# Patient Record
Sex: FEMALE | Race: OTHER | Employment: UNEMPLOYED | URBAN - METROPOLITAN AREA
[De-identification: names, ages, dates, MRNs, and addresses within clinical notes are randomized per-mention and may not be internally consistent; named-entity substitution may affect disease eponyms.]

---

## 2022-04-30 ENCOUNTER — APPOINTMENT (OUTPATIENT)
Dept: GENERAL RADIOLOGY | Age: 77
DRG: 139 | End: 2022-04-30
Payer: MEDICAID

## 2022-04-30 ENCOUNTER — HOSPITAL ENCOUNTER (INPATIENT)
Age: 77
LOS: 2 days | Discharge: HOME | DRG: 139 | End: 2022-05-03
Attending: EMERGENCY MEDICINE | Admitting: INTERNAL MEDICINE
Payer: MEDICAID

## 2022-04-30 DIAGNOSIS — J18.9 PNEUMONIA OF BOTH LUNGS DUE TO INFECTIOUS ORGANISM, UNSPECIFIED PART OF LUNG: Primary | ICD-10-CM

## 2022-04-30 DIAGNOSIS — N39.0 URINARY TRACT INFECTION IN FEMALE: ICD-10-CM

## 2022-04-30 DIAGNOSIS — A41.9 SEPTICEMIA (HCC): ICD-10-CM

## 2022-04-30 LAB
ABSOLUTE BANDS #: 1.88 K/UL (ref 0–1)
ABSOLUTE EOS #: 0.17 K/UL (ref 0–0.4)
ABSOLUTE LYMPH #: 1.88 K/UL (ref 1–4.8)
ABSOLUTE MONO #: 1.37 K/UL (ref 0.1–1.3)
ALBUMIN SERPL-MCNC: 3.9 G/DL (ref 3.5–5.2)
ALP BLD-CCNC: 139 U/L (ref 35–104)
ALT SERPL-CCNC: 12 U/L (ref 5–33)
ANION GAP SERPL CALCULATED.3IONS-SCNC: 12 MMOL/L (ref 9–17)
AST SERPL-CCNC: 22 U/L
BANDS: 11 % (ref 0–10)
BASOPHILS # BLD: 0 % (ref 0–2)
BASOPHILS ABSOLUTE: 0 K/UL (ref 0–0.2)
BILIRUB SERPL-MCNC: 0.55 MG/DL (ref 0.3–1.2)
BILIRUBIN URINE: NEGATIVE
BUN BLDV-MCNC: 13 MG/DL (ref 8–23)
CALCIUM SERPL-MCNC: 8.9 MG/DL (ref 8.6–10.4)
CHLORIDE BLD-SCNC: 97 MMOL/L (ref 98–107)
CO2: 22 MMOL/L (ref 20–31)
COLOR: YELLOW
CREAT SERPL-MCNC: 0.73 MG/DL (ref 0.5–0.9)
D-DIMER QUANTITATIVE: 1.69 MG/L FEU (ref 0–0.59)
EOSINOPHILS RELATIVE PERCENT: 1 % (ref 0–4)
FLU A ANTIGEN: NEGATIVE
FLU B ANTIGEN: NEGATIVE
GFR AFRICAN AMERICAN: >60 ML/MIN
GFR NON-AFRICAN AMERICAN: >60 ML/MIN
GFR SERPL CREATININE-BSD FRML MDRD: ABNORMAL ML/MIN/{1.73_M2}
GLUCOSE BLD-MCNC: 167 MG/DL (ref 70–99)
GLUCOSE URINE: NEGATIVE
HCT VFR BLD CALC: 37.4 % (ref 36–46)
HEMOGLOBIN: 12.5 G/DL (ref 12–16)
INR BLD: 1.1
KETONES, URINE: NEGATIVE
LACTIC ACID, SEPSIS: 1.1 MMOL/L (ref 0.5–1.9)
LEUKOCYTE ESTERASE, URINE: ABNORMAL
LYMPHOCYTES # BLD: 11 % (ref 24–44)
MCH RBC QN AUTO: 27.3 PG (ref 26–34)
MCHC RBC AUTO-ENTMCNC: 33.4 G/DL (ref 31–37)
MCV RBC AUTO: 81.7 FL (ref 80–100)
MONOCYTES # BLD: 8 % (ref 1–7)
MORPHOLOGY: ABNORMAL
NITRITE, URINE: POSITIVE
PARTIAL THROMBOPLASTIN TIME: 28.1 SEC (ref 24–36)
PDW BLD-RTO: 16.2 % (ref 11.5–14.9)
PH UA: 6 (ref 5–8)
PLATELET # BLD: 194 K/UL (ref 150–450)
PMV BLD AUTO: 7.9 FL (ref 6–12)
POTASSIUM SERPL-SCNC: 4.1 MMOL/L (ref 3.7–5.3)
PRO-BNP: 912 PG/ML
PROTEIN UA: ABNORMAL
PROTHROMBIN TIME: 14.6 SEC (ref 11.8–14.6)
RBC # BLD: 4.58 M/UL (ref 4–5.2)
SARS-COV-2, RAPID: NOT DETECTED
SEG NEUTROPHILS: 69 % (ref 36–66)
SEGMENTED NEUTROPHILS ABSOLUTE COUNT: 11.8 K/UL (ref 1.3–9.1)
SODIUM BLD-SCNC: 131 MMOL/L (ref 135–144)
SPECIFIC GRAVITY UA: 1.02 (ref 1–1.03)
SPECIMEN DESCRIPTION: NORMAL
TOTAL PROTEIN: 7.8 G/DL (ref 6.4–8.3)
TROPONIN, HIGH SENSITIVITY: 9 NG/L (ref 0–14)
TURBIDITY: ABNORMAL
URINE HGB: ABNORMAL
UROBILINOGEN, URINE: NORMAL
WBC # BLD: 17.1 K/UL (ref 3.5–11)

## 2022-04-30 PROCEDURE — 85025 COMPLETE CBC W/AUTO DIFF WBC: CPT

## 2022-04-30 PROCEDURE — 85730 THROMBOPLASTIN TIME PARTIAL: CPT

## 2022-04-30 PROCEDURE — 6370000000 HC RX 637 (ALT 250 FOR IP): Performed by: STUDENT IN AN ORGANIZED HEALTH CARE EDUCATION/TRAINING PROGRAM

## 2022-04-30 PROCEDURE — 99285 EMERGENCY DEPT VISIT HI MDM: CPT

## 2022-04-30 PROCEDURE — 71045 X-RAY EXAM CHEST 1 VIEW: CPT

## 2022-04-30 PROCEDURE — 93005 ELECTROCARDIOGRAM TRACING: CPT | Performed by: STUDENT IN AN ORGANIZED HEALTH CARE EDUCATION/TRAINING PROGRAM

## 2022-04-30 PROCEDURE — 87077 CULTURE AEROBIC IDENTIFY: CPT

## 2022-04-30 PROCEDURE — 87635 SARS-COV-2 COVID-19 AMP PRB: CPT

## 2022-04-30 PROCEDURE — 96374 THER/PROPH/DIAG INJ IV PUSH: CPT

## 2022-04-30 PROCEDURE — 87804 INFLUENZA ASSAY W/OPTIC: CPT

## 2022-04-30 PROCEDURE — 2580000003 HC RX 258: Performed by: STUDENT IN AN ORGANIZED HEALTH CARE EDUCATION/TRAINING PROGRAM

## 2022-04-30 PROCEDURE — 85610 PROTHROMBIN TIME: CPT

## 2022-04-30 PROCEDURE — 87040 BLOOD CULTURE FOR BACTERIA: CPT

## 2022-04-30 PROCEDURE — 84484 ASSAY OF TROPONIN QUANT: CPT

## 2022-04-30 PROCEDURE — 80053 COMPREHEN METABOLIC PANEL: CPT

## 2022-04-30 PROCEDURE — 36415 COLL VENOUS BLD VENIPUNCTURE: CPT

## 2022-04-30 PROCEDURE — 81001 URINALYSIS AUTO W/SCOPE: CPT

## 2022-04-30 PROCEDURE — 83880 ASSAY OF NATRIURETIC PEPTIDE: CPT

## 2022-04-30 PROCEDURE — 87086 URINE CULTURE/COLONY COUNT: CPT

## 2022-04-30 PROCEDURE — 85379 FIBRIN DEGRADATION QUANT: CPT

## 2022-04-30 PROCEDURE — 87186 SC STD MICRODIL/AGAR DIL: CPT

## 2022-04-30 PROCEDURE — 83605 ASSAY OF LACTIC ACID: CPT

## 2022-04-30 RX ORDER — FAMOTIDINE 20 MG/1
TABLET, FILM COATED ORAL
COMMUNITY

## 2022-04-30 RX ORDER — GLIMEPIRIDE 2 MG/1
TABLET ORAL
Status: ON HOLD | COMMUNITY
End: 2022-05-03 | Stop reason: HOSPADM

## 2022-04-30 RX ORDER — 0.9 % SODIUM CHLORIDE 0.9 %
1000 INTRAVENOUS SOLUTION INTRAVENOUS ONCE
Status: COMPLETED | OUTPATIENT
Start: 2022-04-30 | End: 2022-05-01

## 2022-04-30 RX ORDER — MEMANTINE HYDROCHLORIDE 5 MG/1
TABLET ORAL
COMMUNITY

## 2022-04-30 RX ORDER — ACETAMINOPHEN 500 MG
1000 TABLET ORAL ONCE
Status: COMPLETED | OUTPATIENT
Start: 2022-04-30 | End: 2022-04-30

## 2022-04-30 RX ORDER — CLOPIDOGREL BISULFATE 75 MG/1
TABLET ORAL
COMMUNITY

## 2022-04-30 RX ORDER — GABAPENTIN 800 MG/1
TABLET ORAL
COMMUNITY

## 2022-04-30 RX ORDER — DONEPEZIL HYDROCHLORIDE 10 MG/1
TABLET, FILM COATED ORAL
COMMUNITY

## 2022-04-30 RX ORDER — CLONIDINE HYDROCHLORIDE 0.1 MG/1
TABLET ORAL
COMMUNITY

## 2022-04-30 RX ORDER — ATENOLOL 50 MG/1
TABLET ORAL
COMMUNITY

## 2022-04-30 RX ORDER — ZOLPIDEM TARTRATE 10 MG/1
10 TABLET ORAL
Status: ON HOLD | COMMUNITY
End: 2022-05-03 | Stop reason: HOSPADM

## 2022-04-30 RX ADMIN — ACETAMINOPHEN 1000 MG: 500 TABLET ORAL at 22:29

## 2022-04-30 RX ADMIN — SODIUM CHLORIDE 1000 ML: 9 INJECTION, SOLUTION INTRAVENOUS at 22:55

## 2022-04-30 ASSESSMENT — PAIN - FUNCTIONAL ASSESSMENT: PAIN_FUNCTIONAL_ASSESSMENT: NONE - DENIES PAIN

## 2022-05-01 ENCOUNTER — APPOINTMENT (OUTPATIENT)
Dept: CT IMAGING | Age: 77
DRG: 139 | End: 2022-05-01
Payer: MEDICAID

## 2022-05-01 PROBLEM — J18.9 PNEUMONIA: Status: ACTIVE | Noted: 2022-05-01

## 2022-05-01 PROBLEM — E66.01 MORBID OBESITY (HCC): Status: ACTIVE | Noted: 2022-05-01

## 2022-05-01 LAB
-: ABNORMAL
BACTERIA: ABNORMAL
EPITHELIAL CELLS UA: ABNORMAL /HPF
GLUCOSE BLD-MCNC: 157 MG/DL (ref 65–105)
GLUCOSE BLD-MCNC: 195 MG/DL (ref 65–105)
GLUCOSE BLD-MCNC: 354 MG/DL (ref 65–105)
RBC UA: ABNORMAL /HPF
WBC UA: ABNORMAL /HPF

## 2022-05-01 PROCEDURE — 6360000002 HC RX W HCPCS: Performed by: STUDENT IN AN ORGANIZED HEALTH CARE EDUCATION/TRAINING PROGRAM

## 2022-05-01 PROCEDURE — 6370000000 HC RX 637 (ALT 250 FOR IP): Performed by: STUDENT IN AN ORGANIZED HEALTH CARE EDUCATION/TRAINING PROGRAM

## 2022-05-01 PROCEDURE — 94640 AIRWAY INHALATION TREATMENT: CPT

## 2022-05-01 PROCEDURE — 2700000000 HC OXYGEN THERAPY PER DAY

## 2022-05-01 PROCEDURE — 6360000002 HC RX W HCPCS: Performed by: INTERNAL MEDICINE

## 2022-05-01 PROCEDURE — 94664 DEMO&/EVAL PT USE INHALER: CPT

## 2022-05-01 PROCEDURE — 2580000003 HC RX 258: Performed by: STUDENT IN AN ORGANIZED HEALTH CARE EDUCATION/TRAINING PROGRAM

## 2022-05-01 PROCEDURE — 6370000000 HC RX 637 (ALT 250 FOR IP): Performed by: INTERNAL MEDICINE

## 2022-05-01 PROCEDURE — 82947 ASSAY GLUCOSE BLOOD QUANT: CPT

## 2022-05-01 PROCEDURE — 6360000004 HC RX CONTRAST MEDICATION: Performed by: STUDENT IN AN ORGANIZED HEALTH CARE EDUCATION/TRAINING PROGRAM

## 2022-05-01 PROCEDURE — 71260 CT THORAX DX C+: CPT

## 2022-05-01 PROCEDURE — 2060000000 HC ICU INTERMEDIATE R&B

## 2022-05-01 PROCEDURE — 99223 1ST HOSP IP/OBS HIGH 75: CPT | Performed by: INTERNAL MEDICINE

## 2022-05-01 RX ORDER — INSULIN LISPRO 100 [IU]/ML
0-12 INJECTION, SOLUTION INTRAVENOUS; SUBCUTANEOUS
Status: DISCONTINUED | OUTPATIENT
Start: 2022-05-02 | End: 2022-05-02

## 2022-05-01 RX ORDER — CLONIDINE HYDROCHLORIDE 0.1 MG/1
0.1 TABLET ORAL ONCE
Status: COMPLETED | OUTPATIENT
Start: 2022-05-01 | End: 2022-05-01

## 2022-05-01 RX ORDER — FAMOTIDINE 20 MG/1
20 TABLET, FILM COATED ORAL DAILY
Status: DISCONTINUED | OUTPATIENT
Start: 2022-05-01 | End: 2022-05-03 | Stop reason: HOSPADM

## 2022-05-01 RX ORDER — ONDANSETRON 4 MG/1
4 TABLET, ORALLY DISINTEGRATING ORAL EVERY 8 HOURS PRN
Status: DISCONTINUED | OUTPATIENT
Start: 2022-05-01 | End: 2022-05-03 | Stop reason: HOSPADM

## 2022-05-01 RX ORDER — CLOPIDOGREL BISULFATE 75 MG/1
75 TABLET ORAL DAILY
Status: DISCONTINUED | OUTPATIENT
Start: 2022-05-01 | End: 2022-05-03 | Stop reason: HOSPADM

## 2022-05-01 RX ORDER — LEVOFLOXACIN 5 MG/ML
500 INJECTION, SOLUTION INTRAVENOUS EVERY 24 HOURS
Status: DISCONTINUED | OUTPATIENT
Start: 2022-05-01 | End: 2022-05-03 | Stop reason: HOSPADM

## 2022-05-01 RX ORDER — ENOXAPARIN SODIUM 100 MG/ML
40 INJECTION SUBCUTANEOUS DAILY
Status: DISCONTINUED | OUTPATIENT
Start: 2022-05-01 | End: 2022-05-03 | Stop reason: HOSPADM

## 2022-05-01 RX ORDER — DEXTROSE MONOHYDRATE 25 G/50ML
12.5 INJECTION, SOLUTION INTRAVENOUS PRN
Status: DISCONTINUED | OUTPATIENT
Start: 2022-05-01 | End: 2022-05-03 | Stop reason: HOSPADM

## 2022-05-01 RX ORDER — MEMANTINE HYDROCHLORIDE 10 MG/1
5 TABLET ORAL DAILY
Status: DISCONTINUED | OUTPATIENT
Start: 2022-05-01 | End: 2022-05-03 | Stop reason: HOSPADM

## 2022-05-01 RX ORDER — SODIUM CHLORIDE 0.9 % (FLUSH) 0.9 %
10 SYRINGE (ML) INJECTION PRN
Status: DISCONTINUED | OUTPATIENT
Start: 2022-05-01 | End: 2022-05-03 | Stop reason: HOSPADM

## 2022-05-01 RX ORDER — AMLODIPINE BESYLATE 5 MG/1
5 TABLET ORAL DAILY
Status: DISCONTINUED | OUTPATIENT
Start: 2022-05-01 | End: 2022-05-03 | Stop reason: HOSPADM

## 2022-05-01 RX ORDER — DEXTROSE MONOHYDRATE 50 MG/ML
100 INJECTION, SOLUTION INTRAVENOUS PRN
Status: DISCONTINUED | OUTPATIENT
Start: 2022-05-01 | End: 2022-05-03 | Stop reason: HOSPADM

## 2022-05-01 RX ORDER — VALSARTAN 320 MG/1
160 TABLET ORAL DAILY
Status: DISCONTINUED | OUTPATIENT
Start: 2022-05-01 | End: 2022-05-03 | Stop reason: HOSPADM

## 2022-05-01 RX ORDER — NICOTINE POLACRILEX 4 MG
15 LOZENGE BUCCAL PRN
Status: DISCONTINUED | OUTPATIENT
Start: 2022-05-01 | End: 2022-05-03 | Stop reason: HOSPADM

## 2022-05-01 RX ORDER — SODIUM CHLORIDE 9 MG/ML
INJECTION, SOLUTION INTRAVENOUS PRN
Status: DISCONTINUED | OUTPATIENT
Start: 2022-05-01 | End: 2022-05-03 | Stop reason: HOSPADM

## 2022-05-01 RX ORDER — ONDANSETRON 2 MG/ML
4 INJECTION INTRAMUSCULAR; INTRAVENOUS EVERY 6 HOURS PRN
Status: DISCONTINUED | OUTPATIENT
Start: 2022-05-01 | End: 2022-05-03 | Stop reason: HOSPADM

## 2022-05-01 RX ORDER — ATORVASTATIN CALCIUM 10 MG/1
10 TABLET, FILM COATED ORAL DAILY
Status: DISCONTINUED | OUTPATIENT
Start: 2022-05-01 | End: 2022-05-03 | Stop reason: HOSPADM

## 2022-05-01 RX ORDER — ACETAMINOPHEN 325 MG/1
650 TABLET ORAL EVERY 4 HOURS PRN
Status: DISCONTINUED | OUTPATIENT
Start: 2022-05-01 | End: 2022-05-03 | Stop reason: HOSPADM

## 2022-05-01 RX ORDER — DONEPEZIL HYDROCHLORIDE 10 MG/1
10 TABLET, FILM COATED ORAL NIGHTLY
Status: DISCONTINUED | OUTPATIENT
Start: 2022-05-01 | End: 2022-05-03 | Stop reason: HOSPADM

## 2022-05-01 RX ORDER — IPRATROPIUM BROMIDE AND ALBUTEROL SULFATE 2.5; .5 MG/3ML; MG/3ML
1 SOLUTION RESPIRATORY (INHALATION) EVERY 4 HOURS PRN
Status: DISCONTINUED | OUTPATIENT
Start: 2022-05-01 | End: 2022-05-03 | Stop reason: HOSPADM

## 2022-05-01 RX ORDER — LEVOTHYROXINE SODIUM 0.1 MG/1
100 TABLET ORAL DAILY
Status: DISCONTINUED | OUTPATIENT
Start: 2022-05-01 | End: 2022-05-03 | Stop reason: HOSPADM

## 2022-05-01 RX ORDER — SODIUM CHLORIDE 0.9 % (FLUSH) 0.9 %
5-40 SYRINGE (ML) INJECTION EVERY 12 HOURS SCHEDULED
Status: DISCONTINUED | OUTPATIENT
Start: 2022-05-01 | End: 2022-05-03 | Stop reason: HOSPADM

## 2022-05-01 RX ORDER — ATENOLOL 50 MG/1
50 TABLET ORAL DAILY
Status: DISCONTINUED | OUTPATIENT
Start: 2022-05-01 | End: 2022-05-03 | Stop reason: HOSPADM

## 2022-05-01 RX ORDER — SODIUM CHLORIDE 0.9 % (FLUSH) 0.9 %
5-40 SYRINGE (ML) INJECTION PRN
Status: DISCONTINUED | OUTPATIENT
Start: 2022-05-01 | End: 2022-05-03 | Stop reason: HOSPADM

## 2022-05-01 RX ORDER — INSULIN LISPRO 100 [IU]/ML
0-6 INJECTION, SOLUTION INTRAVENOUS; SUBCUTANEOUS NIGHTLY
Status: DISCONTINUED | OUTPATIENT
Start: 2022-05-01 | End: 2022-05-02

## 2022-05-01 RX ORDER — IPRATROPIUM BROMIDE AND ALBUTEROL SULFATE 2.5; .5 MG/3ML; MG/3ML
SOLUTION RESPIRATORY (INHALATION)
Status: DISPENSED
Start: 2022-05-01 | End: 2022-05-01

## 2022-05-01 RX ORDER — METHYLPREDNISOLONE SODIUM SUCCINATE 40 MG/ML
40 INJECTION, POWDER, LYOPHILIZED, FOR SOLUTION INTRAMUSCULAR; INTRAVENOUS DAILY
Status: DISCONTINUED | OUTPATIENT
Start: 2022-05-01 | End: 2022-05-02

## 2022-05-01 RX ORDER — 0.9 % SODIUM CHLORIDE 0.9 %
80 INTRAVENOUS SOLUTION INTRAVENOUS ONCE
Status: COMPLETED | OUTPATIENT
Start: 2022-05-01 | End: 2022-05-01

## 2022-05-01 RX ADMIN — MEMANTINE 5 MG: 10 TABLET ORAL at 09:58

## 2022-05-01 RX ADMIN — INSULIN LISPRO 5 UNITS: 100 INJECTION, SOLUTION INTRAVENOUS; SUBCUTANEOUS at 21:09

## 2022-05-01 RX ADMIN — CLOPIDOGREL BISULFATE 75 MG: 75 TABLET ORAL at 09:58

## 2022-05-01 RX ADMIN — AZITHROMYCIN MONOHYDRATE 500 MG: 500 INJECTION, POWDER, LYOPHILIZED, FOR SOLUTION INTRAVENOUS at 01:38

## 2022-05-01 RX ADMIN — AMLODIPINE BESYLATE 5 MG: 5 TABLET ORAL at 09:59

## 2022-05-01 RX ADMIN — DONEPEZIL HYDROCHLORIDE 10 MG: 10 TABLET ORAL at 20:49

## 2022-05-01 RX ADMIN — METHYLPREDNISOLONE SODIUM SUCCINATE 40 MG: 40 INJECTION, POWDER, FOR SOLUTION INTRAMUSCULAR; INTRAVENOUS at 15:41

## 2022-05-01 RX ADMIN — LEVOTHYROXINE SODIUM 100 MCG: 0.1 TABLET ORAL at 10:02

## 2022-05-01 RX ADMIN — FAMOTIDINE 20 MG: 20 TABLET ORAL at 09:58

## 2022-05-01 RX ADMIN — ATORVASTATIN CALCIUM 10 MG: 10 TABLET, FILM COATED ORAL at 09:59

## 2022-05-01 RX ADMIN — ENOXAPARIN SODIUM 40 MG: 100 INJECTION SUBCUTANEOUS at 09:57

## 2022-05-01 RX ADMIN — SODIUM CHLORIDE 80 ML: 9 INJECTION, SOLUTION INTRAVENOUS at 00:48

## 2022-05-01 RX ADMIN — ATENOLOL 50 MG: 50 TABLET ORAL at 09:59

## 2022-05-01 RX ADMIN — SODIUM CHLORIDE, PRESERVATIVE FREE 10 ML: 5 INJECTION INTRAVENOUS at 21:03

## 2022-05-01 RX ADMIN — IPRATROPIUM BROMIDE AND ALBUTEROL SULFATE 1 AMPULE: .5; 2.5 SOLUTION RESPIRATORY (INHALATION) at 00:59

## 2022-05-01 RX ADMIN — VALSARTAN 160 MG: 320 TABLET, FILM COATED ORAL at 09:58

## 2022-05-01 RX ADMIN — CLONIDINE HYDROCHLORIDE 0.1 MG: 0.1 TABLET ORAL at 10:02

## 2022-05-01 RX ADMIN — SODIUM CHLORIDE, PRESERVATIVE FREE 10 ML: 5 INJECTION INTRAVENOUS at 10:02

## 2022-05-01 RX ADMIN — CEFTRIAXONE SODIUM 1000 MG: 1 INJECTION, POWDER, FOR SOLUTION INTRAMUSCULAR; INTRAVENOUS at 00:51

## 2022-05-01 RX ADMIN — IOPAMIDOL 75 ML: 755 INJECTION, SOLUTION INTRAVENOUS at 00:47

## 2022-05-01 RX ADMIN — SODIUM CHLORIDE, PRESERVATIVE FREE 10 ML: 5 INJECTION INTRAVENOUS at 00:48

## 2022-05-01 RX ADMIN — LEVOFLOXACIN 500 MG: 5 INJECTION, SOLUTION INTRAVENOUS at 15:41

## 2022-05-01 ASSESSMENT — ENCOUNTER SYMPTOMS
ABDOMINAL PAIN: 0
COLOR CHANGE: 0
VOMITING: 0
COUGH: 1
SHORTNESS OF BREATH: 1
NAUSEA: 0

## 2022-05-01 NOTE — ED NOTES
Pt placed in gown. Pt hooked up to NIBP, pulse ox, and cardiac monitoring continuously. Pt placed on 2L O2 via NC for a spo2 of 88% ORA.       Vandana Corea RN  04/30/22 3727

## 2022-05-01 NOTE — DISCHARGE INSTR - COC
Continuity of Care Form    Patient Name: Denny Stroud   :  1945  MRN:  [id number]    Admit date:  2022  Discharge date:  ***    Code Status Order: No Order   Advance Directives:      Admitting Physician:  No admitting provider for patient encounter. PCP: James Carter MD    Discharging Nurse: [facility] Unit/Room#: 07A/07A  Discharging Unit Phone Number: ***    Emergency Contact:   Extended Emergency Contact Information  Primary Emergency Contact: [address] [facility] Phone: [phone]  Relation: Brother/Sister    Past Surgical History:  Past Surgical History:   Procedure Laterality Date    BACK SURGERY      BACK SURGERY      pinched  nerve    CHOLECYSTECTOMY         Immunization History: There is no immunization history on file for this patient. Active Problems:  Patient Active Problem List   Diagnosis Code    Diabetes ([facility] [address] E11.9    HTN (hypertension) I10    Hyperlipidemia E78.5    Hypothyroidism E03.9       Isolation/Infection:   Isolation            No Isolation          Patient Infection Status       Infection Onset Added Last Indicated Last Indicated By Review Planned Expiration Resolved Resolved By    COVID-19 (Rule Out) 22 COVID-19, Rapid (Ordered) 22      MDRO (multi-drug resistant organism)  04/02/15 04/02/15 Mic Becker RN        3/30/2015 E.  Coli urine            Nurse Assessment:  Last Vital Signs: BP (!) 168/53   Pulse 82   Temp 103 °F (39.4 °C) (Oral)   Resp 20   Ht 4' 11\" (1.499 m)   Wt 200 lb (90.7 kg)   SpO2 92%   BMI 40.40 kg/m²     Last documented pain score (0-10 scale):    Last Weight:   Wt Readings from Last 1 Encounters:   22 200 lb (90.7 kg)     Mental Status:  {IP PT MENTAL STATUS:46214}    IV Access:  { KAMLA IV ACCESS:932244661}    Nursing Mobility/ADLs:  Walking   {CHP DME MFBC:857655728}  Transfer  {CHP DME EBJV:849372685}  Bathing  {CHP DME YINH:486724207}  Dressing  {CHP DME XGYI:920271364}  Toileting  {CHP DME DXZA:384893215}  Feeding  {CHP DME KAKO:886414293}  Med Admin  {CHP DME YIRC:222971580}  Med Delivery   { KAMLA MED Delivery:560262537}    Wound Care Documentation and Therapy:        Elimination:  Continence: Bowel: {YES / NS:19986}  Bladder: {YES / KZ:32176}  Urinary Catheter: {Urinary Catheter:451830210}   Colostomy/Ileostomy/Ileal Conduit: {YES / PD:26398}       Date of Last BM: ***  No intake or output data in the 24 hours ending 22  No intake/output data recorded.     Safety Concerns:     [address] Safety Concerns:138626809}    Impairments/Disabilities:      [address] Impairments/Disabilities:939485225}    Nutrition Therapy:  Current Nutrition Therapy:   [address] [employer] Diet List:943791454}    Routes of Feeding: {[facility] DME Other Feedings:835643386}  Liquids: {Slp liquid thickness:05805}  Daily Fluid Restriction: {CHP DME Yes amt example:336357381}  Last Modified Barium Swallow with Video (Video Swallowing Test): {Done Not Done AUKQ:970318948}    Treatments at the Time of Hospital Discharge:   Respiratory Treatments: ***  Oxygen Therapy:  {Therapy; copd oxygen:32278}  Ventilator:    { CC Vent DFHB:047710990}    Rehab Therapies: {THERAPEUTIC INTERVENTION:7410012007}  Weight Bearing Status/Restrictions: [employer]  Weight Bearin}  Other Medical Equipment (for information only, NOT a DME order):  {EQUIPMENT:193284475}  Other Treatments: ***    Patient's personal belongings (please select all that are sent with patient):  {[facility] DME Belongings:073833594}    RN SIGNATURE:  {Esignature:375771625}    CASE MANAGEMENT/SOCIAL WORK SECTION    Inpatient Status Date: ***    Readmission Risk Assessment Score:  Readmission Risk              Risk of Unplanned Readmission:  0           Discharging to Facility/ Agency   Name:   Address:  Phone:  Fax:    Dialysis Facility (if applicable)   Name:  Address:  Dialysis Schedule:  Phone:  Fax:    / signature: {Esignature:947911158}    PHYSICIAN SECTION    Prognosis: {Prognosis:3423112363}    Condition at Discharge: [address] Patient Condition:763609836}    Rehab Potential (if transferring to Rehab): {Prognosis:3640237280}    Recommended Labs or Other Treatments After Discharge: ***    Physician Certification: I certify the above information and transfer of Yimi Johnson  is necessary for the continuing treatment of the diagnosis listed and that she requires {Admit to Appropriate Level of Care:90065} for {GREATER/LESS:205431531} 30 days.      Update Admission H&P: {CHP DME Changes in YUBNI:126453663}    PHYSICIAN SIGNATURE:  {Esignature:626929606}

## 2022-05-01 NOTE — FLOWSHEET NOTE
05/01/22 1450   Encounter Summary   Encounter Overview/Reason  Volunteer Encounter   Service Provided For: Patient   Referral/Consult From: Rounding   Last Encounter  05/01/22  (V)   Complexity of Encounter Low   Spiritual/Emotional needs   Type Spiritual Support   Rituals, Rites and [address]

## 2022-05-01 NOTE — PLAN OF CARE
VSS, assessments as charted. Labs/tests reviewed and reported PRN. Meds and therapies as ordered. Uses call light appropriately.   Up with stand by assist.  Gait steady

## 2022-05-01 NOTE — ED NOTES
The following labs labeled with pt sticker and tubed to lab:       [x] COVID-19 swab    [x] Rapid  [] PCR  [x] Flu Swab    [] Urine Sample  [] Pelvic Cultures  [x] Blood Cultures       Megan Bazan RN  04/30/22 2819

## 2022-05-01 NOTE — ED NOTES
Pt to CT in stretcher. Portable O2 set to 2L delivered via NC. Rocephin primed and ready for administration upon pts return to ED. Pt and family updated by Dr. Austin Walker at this time.       Annabella Smith RN  05/01/22 0813

## 2022-05-01 NOTE — ED NOTES
The following labs labeled with pt sticker and tubed to lab:     [x] Blue     [x] Lavender   [] on ice  [x] Green/yellow  [] Green/black [] on ice  [] Yellow  [x] Grey on ice      [] COVID-19 swab    [] Rapid  [] PCR  [] Peds Viral Panel     [] Urine Sample  [] Pelvic Cultures  [x] Blood Cultures        Sharmaine Chacon RN  04/30/22 9546

## 2022-05-01 NOTE — PROGRESS NOTES
05/01/22 0056   Respiratory   Respiratory Pattern Regular   Respiratory Depth Normal   Respiratory Quality/Effort Unlabored   Chest Assessment Chest expansion symmetrical   L Breath Sounds Diminished   R Breath Sounds Diminished   Cough/Sputum   Cough Strong;Productive   Modified Brock Scale   Modified Brock Scale 1   Additional Respiratory Assessments   Pulse 62   Resp 20   SpO2 93 %   Position Semi-Perez's

## 2022-05-01 NOTE — CARE COORDINATION
CASE MANAGEMENT NOTE:    Admission Date:  4/30/2022 Solis Guerra is a 78 y.o.  female    Admitted for : Pneumonia [J18.9]  Septicemia ([facility] [address] [A41.9]  Pneumonia of both lungs due to infectious organism, unspecified part of lung [J18.9]  Urinary tract infection in female [N39.0]    Met with:  Patient    PCP:  [address]       Is patient alert and oriented at time of discussion:  Yes    Current Residence/ Living Arrangements:  independently at home with dtr in [address] [place] [address] PTA:  No    Does patient go to outpatient dialysis: No  If yes, location and chair time: NA    Is patient agreeable to VNS: No    Freedom of choice provided:  No    List of [address] provided: No    VNS chosen:  No    DME:  other glucometer    Home Oxygen: No    Nebulizer: No    CPAP/BIPAP: No    Supplier: N/A    Potential Assistance Needed: No    SNF needed: No    Freedom of choice and list provided: No    Pharmacy:  [facility] on [address]       Is patient currently receiving oral anticoagulation therapy? No    Is the Patient an [facility] with Readmission Risk Score greater than 14%? No  If yes, pt needs a follow up appointment made within 7 days. Family Members/Caregivers that pt would like involved in their care:    Yes    If yes, list name here:  Sister alberto    Transportation Provider:  Family             Discharge Plan:  5/1/22 [facility] MEDICARE From home with dtr in [place]. Pt is just visiting her sister Casandra Catalan who lives here. Pt was to return to [place] on Wed. DME: glucometer VNS:none. IV levaquin , IV solumedrol 40 mg daily. Following for needs//JF                Electronically signed by: Ceci Posada RN on 5/1/2022 at 1:29 PM

## 2022-05-01 NOTE — ED NOTES
Report given to , RN from PCU  Report method in person   The following was reviewed with receiving RN: (!) /99  Temp 99.1F  Pulse 60bpm  Resp 20  spo2 95% on 2L NC   Current vital signs:                  Any medication or safety alerts were reviewed. Any pending diagnostics and notifications were also reviewed, as well as any safety concerns or issues, abnormal labs, abnormal imaging, and abnormal assessment findings. Questions were answered.         Robel Wilkes RN  05/01/22 0505

## 2022-05-01 NOTE — ED NOTES
Price placed at the bedside. Pt educated on need for urine sample. Pt instructed to press on call light when able to give specimen.      Yinka Sena RN  04/30/22 0907

## 2022-05-01 NOTE — ACP (ADVANCE CARE PLANNING)
Advance Care Planning     Advance Care Planning Activator (Inpatient)  Conversation Note      Date of ACP Conversation: 5/1/2022     Conversation Conducted with: Patient with Decision Making Capacity    ACP Activator: Octavio Ni RN        Health Care Decision Maker:     Current Designated Health Care Decision Maker:     Click here to complete [address] including section of the Healthcare Decision Maker Relationship (ie \"Primary\")  Today we documented Decision Maker(s) consistent with Legal Next of Kin hierarchy. Care Preferences    Ventilation: \"If you were in your present state of health and suddenly became very ill and were unable to breathe on your own, what would your preference be about the use of a ventilator (breathing machine) if it were available to you? \"      Would the patient desire the use of ventilator (breathing machine)?: yes    \"If your health worsens and it becomes clear that your chance of recovery is unlikely, what would your preference be about the use of a ventilator (breathing machine) if it were available to you? \"     Would the patient desire the use of ventilator (breathing machine)?: Yes      Resuscitation  \"CPR works best to restart the heart when there is a sudden event, like a heart attack, in someone who is otherwise healthy. Unfortunately, CPR does not typically restart the heart for people who have serious health conditions or who are very sick. \"    \"In the event your heart stopped as a result of an underlying serious health condition, would you want attempts to be made to restart your heart (answer \"yes\" for attempt to resuscitate) or would you prefer a natural death (answer \"no\" for do not attempt to resuscitate)? \" yes       [x] Yes   [] No   Educated Patient / Reji Public regarding differences between Advance Directives and portable DNR orders.     Length of ACP Conversation in minutes:      Conversation Outcomes:  [x] ACP discussion completed  [] Existing advance directive reviewed with patient; no changes to patient's previously recorded wishes  [] New Advance Directive completed  [] Portable Do Not Rescitate prepared for Provider review and signature  [] POLST/POST/MOLST/MOST prepared for Provider review and signature      Follow-up plan:    [] Schedule follow-up conversation to continue planning  [] Referred individual to Provider for additional questions/concerns   [] Advised patient/agent/surrogate to review completed ACP document and update if needed with changes in condition, patient preferences or care setting    [] This note routed to one or more involved healthcare providers

## 2022-05-01 NOTE — PLAN OF CARE
Problem: Discharge Planning  Goal: Discharge to home or other facility with appropriate resources  Outcome: Progressing     Problem: Safety - Adult  Goal: Free from fall injury  Outcome: Progressing  Goal: Free from fall injury  Description: INTERVENTIONS:  Outcome: Progressing     Problem: Skin/Tissue Integrity  Goal: Skin integrity intact  Outcome: Progressing

## 2022-05-01 NOTE — ED PROVIDER NOTES
Marin Parrish [address]     Pt Name: Caridad Sanders  MRN: [id number]  [place] 1945  Date of evaluation: 4/30/22       Caridad Sanders is a 78 y.o. female who presents with Fever      MDM: Patient was noted to have a urinary tract infection and pneumonia and was treated with antibiotics and was given oxygen supplementation and admitted      Vitals:   Vitals:    04/30/22 2211 04/30/22 2214   BP: (!) 168/53    Pulse: 82 82   Resp: 20    Temp: 103 °F (39.4 °C)    TempSrc: Oral    SpO2: (!) 88% 92%   Weight: 200 lb (90.7 kg)    Height: 4' 11\" (1.499 m)          I personally saw and examined the patient. I have reviewed and agree with the resident's findings, including all diagnostic interpretations and treatment plan as written. I was present for the key portions of any procedures performed and the inclusive time noted for any critical care statement.       The care is provided during an unprecedented national emergency due to the novel coronavirus, COVID [facility]  Attending Emergency Physician         Aury Ocampo DO  05/01/22 5989

## 2022-05-01 NOTE — ED NOTES
Code S called at [phone].      Afsaneh Rahman  04/30/22 2220       Natalie Christie  04/30/22 2231

## 2022-05-01 NOTE — ED PROVIDER NOTES
[facility] ED  Emergency Department Encounter  Emergency Medicine Resident     Pt Name: Daniela Payne  MRN: [id number]  [place] 1945  Date of evaluation: 4/30/22  PCP:  Gabriel Kauffman MD    CHIEF COMPLAINT       Chief Complaint   Patient presents with    Fever       HISTORY OFPRESENT ILLNESS  (Location/Symptom, Timing/Onset, Context/Setting, Quality, Duration, Modifying [place]uevenia Stair.)      Daniela Payne is a 78 y.o. female with past medical history of diabetes, hyperlipidemia, hypertension, CAD status post 1 stenting who presents with family members for complaints of fever, myalgias, cough and shortness of breath. Symptoms have been ongoing for the last 7 days however has been progressively worsening. Patient has a cough productive of yellow sputum. No known COVID-19 or other sick contacts at home. Patient states she is visiting from [place] as she has family members in the area. Denies chest pain, nausea, vomiting. Upon initial evaluation patient is hypoxic at 84% on room air, no prior history of requiring oxygen. PAST MEDICAL / SURGICAL / SOCIAL / FAMILY HISTORY      has a past medical history of Diabetes mellitus ([address] Hyperlipidemia, Hypertension, Hypothyroidism, MDRO (multiple drug resistant organisms) resistance, Thyroid disease, and Type II or unspecified type diabetes mellitus without mention of complication, not stated as uncontrolled. has a past surgical history that includes back surgery; back surgery; and Cholecystectomy.     Social History     Socioeconomic History    Marital status:      Spouse name: Not on file    Number of children: Not on file    Years of education: Not on file    Highest education level: Not on file   Occupational History    Not on file   Tobacco Use    Smoking status: Never Smoker    Smokeless tobacco: Never Used   Substance and Sexual Activity    Alcohol use: No    Drug use: No    Sexual activity: Not on file   Other Topics Concern    Not on file   Social History Narrative    ** Merged History Encounter **          Social Determinants of Health     Financial Resource Strain:     Difficulty of Paying Living Expenses: Not on file   Food Insecurity:     Worried About Running Out of Food in the Last Year: Not on file    Karyn of Food in the Last Year: Not on file   Transportation Needs:     Lack of Transportation (Medical): Not on file    Lack of Transportation (Non-Medical): Not on file   Physical Activity:     Days of Exercise per Week: Not on file    Minutes of Exercise per Session: Not on file   Stress:     Feeling of Stress : Not on file   Social Connections:     Frequency of Communication with Friends and Family: Not on file    Frequency of Social Gatherings with Friends and Family: Not on file    Attends Gnosticism Services: Not on file    Active Member of [address] [place] [address] [employer] or Organizations: Not on file    Attends Club or Organization Meetings: Not on file    Marital Status: Not on file   Intimate Partner Violence:     Fear of Current or Ex-Partner: Not on file    Emotionally Abused: Not on file    Physically Abused: Not on file    Sexually Abused: Not on file   Housing Stability:     Unable to Pay for Housing in the Last Year: Not on file    Number of [place] in the Last Year: Not on file    Unstable Housing in the Last Year: Not on file       Family History   Problem Relation Age of Onset    Cancer Mother         colon    Other Father         Emphysema    Cancer Brother         colon       Allergies:  Patient has no known allergies. Home Medications:  Prior to Admission medications    Medication Sig Start Date End Date Taking? Authorizing Provider   zolpidem (AMBIEN) 10 MG tablet 10 mg.     Historical Provider, MD   famotidine (PEPCID) 20 MG tablet famotidine 20 mg tablet    Historical Provider, MD   glimepiride (AMARYL) 2 MG tablet glimepiride 2 mg tablet    Historical Provider, MD   cloNIDine (CATAPRES) 0.1 MG tablet clonidine HCl 0.1 mg tablet    Historical Provider, MD   gabapentin (NEURONTIN) 800 MG tablet gabapentin 800 mg tablet    Historical Provider, MD   donepezil (ARICEPT) 10 MG tablet donepezil 10 mg tablet    Historical Provider, MD   sertraline (ZOLOFT) 50 MG tablet sertraline 50 mg tablet    Historical Provider, MD   clopidogrel (PLAVIX) 75 MG tablet clopidogrel 75 mg tablet    Historical Provider, MD   atenolol (TENORMIN) 50 MG tablet atenolol 50 mg tablet    Historical Provider, MD   memantine (NAMENDA) 5 MG tablet memantine 5 mg tablet    Historical Provider, MD   cephALEXin (KEFLEX) 500 MG capsule Take 1 capsule by mouth 2 times daily. 4/2/15   Elvira Hernandes MD   metFORMIN (GLUCOPHAGE) 500 MG tablet Take 500 mg by mouth 2 times daily (with meals). Historical Provider, MD   amLODIPine (NORVASC) 5 MG tablet Take 5 mg by mouth daily. Historical Provider, MD   valsartan (DIOVAN) 160 MG tablet Take 160 mg by mouth daily. Historical Provider, MD   levothyroxine (SYNTHROID) 100 MCG tablet Take 100 mcg by mouth Daily. Historical Provider, MD   simvastatin (ZOCOR) 20 MG tablet Take 20 mg by mouth nightly. Historical Provider, MD   levothyroxine (SYNTHROID) 100 MCG tablet Take 100 mcg by mouth Daily. Historical Provider, MD   amLODIPine (NORVASC) 5 MG tablet Take 5 mg by mouth daily. Historical Provider, MD   valsartan (DIOVAN) 160 MG tablet Take 160 mg by mouth daily. Historical Provider, MD   metFORMIN (GLUCOPHAGE) 500 MG tablet Take 500 mg by mouth 2 times daily (with meals). Historical Provider, MD   ondansetron (ZOFRAN) 4 MG tablet Take 1 tablet by mouth every 8 hours as needed for Nausea. 2/6/15   Wing Baca, DO       REVIEW OF SYSTEMS    (2-9 systems for level 4, 10 or more for level 5)      Review of Systems   Constitutional: Positive for fatigue and fever. HENT: Negative for congestion. Respiratory: Positive for cough and shortness of breath. Cardiovascular: Negative for chest pain. Gastrointestinal: Negative for abdominal pain, nausea and vomiting. Musculoskeletal: Positive for myalgias. Skin: Negative for color change and rash. Neurological: Negative for weakness, numbness and headaches. Psychiatric/Behavioral: Negative for confusion. PHYSICAL EXAM   (up to 7 for level 4, 8 or more for level 5)     INITIAL VITALS:    height is 4' 11\" (1.499 m) and weight is 200 lb (90.7 kg). Her oral temperature is 99.1 °F (37.3 °C). Her blood pressure is 138/99 (abnormal) and her pulse is 60. Her respiration is 20 and oxygen saturation is 95%. Physical Exam  Constitutional:       Appearance: She is diaphoretic. Comments: Awake, alert and oriented to person, place, time, patient is ill in appearance, moderate respiratory distress, however able to speak in full sentences, she is tachypneic. HENT:      Mouth/Throat:      Mouth: Mucous membranes are moist.      Pharynx: Oropharynx is clear. Eyes:      Pupils: Pupils are equal, round, and reactive to light. Cardiovascular:      Rate and Rhythm: Normal rate and regular rhythm. Heart sounds: No murmur heard. No gallop. Pulmonary:      Comments: Patient tachypneic, able to speak in few word sentences, does appear to be short of breath, lungs are rhonchorous bilaterally, oxygen saturation 84% on room air placed on 3 L nasal cannula with improvement to mid 90s. Abdominal:      General: Abdomen is flat. Palpations: Abdomen is soft. Tenderness: There is no abdominal tenderness. There is no guarding or rebound. Musculoskeletal:      Right lower leg: No edema. Left lower leg: No edema. Skin:     General: Skin is warm. Capillary Refill: Capillary refill takes less than 2 seconds. Neurological:      General: No focal deficit present. Mental Status: She is oriented to person, place, and time. Cranial Nerves: No cranial nerve deficit.       Motor: No weakness. DIFFERENTIAL  DIAGNOSIS     PLAN (LABS / IMAGING / EKG):  Orders Placed This Encounter   Procedures    Culture, Urine    Culture, Blood 1    Culture, Blood 2    COVID-19, Rapid    Rapid influenza A/B antigens    XR CHEST PORTABLE    CT CHEST PULMONARY EMBOLISM W CONTRAST    CBC with Auto Differential    Comprehensive Metabolic Panel w/ Reflex to MG    Urinalysis    APTT    Protime-INR    D-Dimer, Quantitative    Troponin    Brain Natriuretic Peptide    Microscopic Urinalysis    Inpatient consult to Internal Medicine    Respiratory Care Evaluation and Treat    EKG 12 Lead    ADMIT TO INPATIENT       MEDICATIONS ORDERED:  Orders Placed This Encounter   Medications    acetaminophen (TYLENOL) tablet 1,000 mg    0.9 % sodium chloride bolus    cefTRIAXone (ROCEPHIN) 1000 mg IVPB in 50 mL D5W minibag     Order Specific Question:   Antimicrobial Indications     Answer:   Pneumonia (CAP)     Order Specific Question:   Antimicrobial Indications     Answer:   Urinary Tract Infection    azithromycin (ZITHROMAX) 500 mg in D5W 250ml addavial     Order Specific Question:   Antimicrobial Indications     Answer:   Pneumonia (CAP)    iopamidol (ISOVUE-370) 76 % injection 75 mL    0.9 % sodium chloride bolus    sodium chloride flush 0.9 % injection 10 mL    ipratropium-albuterol (DUONEB) 0.5-2.5 (3) MG/3ML nebulizer solution     Burnette Schlatter: cabinet override    ipratropium-albuterol (DUONEB) nebulizer solution 1 ampule     Order Specific Question:   Initiate RT Bronchodilator Protocol     Answer:   Yes       DDX: Sepsis, community acquired pneumonia, COVID, flu, PE, urosepsis    Initial MDM/Plan: 78 y.o. female who presents with 1 week of fever, myalgias, shortness of breath and cough. Visiting from [place]. No known COVID-19 contacts. Seen and examined, patient is awake, alert however is ill in appearance, moderate respiratory distress, speaking in few word sentences.   Saturating 84% on room air. Placed on 3 L nasal cannula with improvement to mid 90s. Temperature 103, nontachycardic however tachypneic. Code S called given fever, tachypnea and suspected underlying septic process. Abdomen soft, tender to palpation suprapubic region however no rebound or guarding and upon palpation patient states she has to use the restroom. Lungs are rhonchorous bilaterally. Will obtain sepsis work-up, suspect underlying pneumonia given hypoxia. COVID testing, D-dimer to evaluate for PE however less likely given fever and productive sputum. Will admit. Sepsis Times and Checklist  Vital Signs: BP: (!) 138/99  Pulse: 60  Resp: 20  Temp: 99.1 °F (37.3 °C) SpO2: 95 %  SIRS (>2)   Temp > 38.3C or < 36C yes   HR > 90 no   RR > 20 yes   WBC > 12 or < 4 or >10% bands yes  SIRS (>2) and confirmed or suspected source of infection = Sepsis  Is Sepsis due to likely bacterial infection?: Yes      Sepsis Identified:   Sepsis Orders:  ·  CBC(required): Yes  ·  CMP: Yes  ·  PT/PTT: Yes  ·  Blood Cultures x2(required): Yes  ·  Urinalysis and Urine Culture: Yes  ·  Lactate(required): Yes  ·  Antibiotics Given (within 3 hours of sepsis identification, after blood cultures):  Yes    (If unable to obtain IV access for IV antibiotics within 3 hours of identification of sepsis, IM antibiotics is acceptable.)  ·             If lactate >2.0 MUST repeat within 6 hours    If elevated, is elevated lactate from a likely infectious source?: lactic normal  IV Fluid Bolus:  Is lactate > 4.0:  No  If lactate >  4.0 OR hypotension (MAP<65 mmHg) (with 2 BP readings) 30ml/kg crystalloid MUST be ordered.   No  ·           DIAGNOSTIC RESULTS / EMERGENCY DEPARTMENT COURSE / MDM     LABS:  Labs Reviewed   CBC WITH AUTO DIFFERENTIAL - Abnormal; Notable for the following components:       Result Value    WBC 17.1 (*)     RDW 16.2 (*)     Seg Neutrophils 69 (*)     Lymphocytes 11 (*)     Monocytes 8 (*)     Bands 11 (*)     Segs Absolute 11.80 (*)     Absolute Mono # 1.37 (*)     Absolute Bands # 1.88 (*)     All other components within normal limits   COMPREHENSIVE METABOLIC PANEL W/ REFLEX TO MG FOR LOW K - Abnormal; Notable for the following components:    Glucose 167 (*)     Sodium 131 (*)     Chloride 97 (*)     Alkaline Phosphatase 139 (*)     All other components within normal limits   URINALYSIS - Abnormal; Notable for the following components:    Turbidity UA Cloudy (*)     Urine Hgb MOD (*)     Protein, UA 3+ (*)     Nitrite, Urine POSITIVE (*)     Leukocyte Esterase, Urine MOD (*)     All other components within normal limits   D-DIMER, QUANTITATIVE - Abnormal; Notable for the following components:    D-Dimer, Quant 1.69 (*)     All other components within normal limits   BRAIN NATRIURETIC PEPTIDE - Abnormal; Notable for the following components:    Pro- (*)     All other components within normal limits   MICROSCOPIC URINALYSIS - Abnormal; Notable for the following components:    Bacteria, UA MANY (*)     All other components within normal limits   CULTURE, BLOOD 2   COVID-19, RAPID   RAPID INFLUENZA A/B ANTIGENS   CULTURE, URINE   CULTURE, BLOOD 1   LACTATE, SEPSIS   APTT   PROTIME-INR   TROPONIN         RADIOLOGY:  XR CHEST PORTABLE    Result Date: 4/30/2022  EXAMINATION: ONE XRAY VIEW OF THE CHEST 4/30/2022 10:24 pm COMPARISON: 02/06/2015 HISTORY: ORDERING SYSTEM PROVIDED HISTORY: hypoxia, fever, rhonchi b/l TECHNOLOGIST PROVIDED HISTORY: hypoxia, fever, rhonchi b/l Reason for Exam: hypoxia, fever, rhonchi b/l Additional signs and symptoms: cough FINDINGS: The patient is rotated to the right on the portable study. Heart size is within normal limits for the portable technique and the lungs are grossly clear. There is no pneumothorax or pleural fluid. Right shoulder reverse prosthesis. Prior left shoulder surgery. No acute bone finding. No acute cardiopulmonary disease.      CT CHEST PULMONARY EMBOLISM W CONTRAST    Result Date: 5/1/2022  EXAMINATION: CTA OF THE CHEST 5/1/2022 12:09 am TECHNIQUE: CTA of the chest was performed after the administration of intravenous contrast.  Multiplanar reformatted images are provided for review. MIP images are provided for review. Dose modulation, iterative reconstruction, and/or weight based adjustment of the mA/kV was utilized to reduce the radiation dose to as low as reasonably achievable. COMPARISON: None. HISTORY: ORDERING SYSTEM PROVIDED HISTORY: elevated d-dimer, hypoxic TECHNOLOGIST PROVIDED HISTORY: elevated d-dimer, hypoxic Decision Support Exception - unselect if not a suspected or confirmed emergency medical condition->Emergency Medical Condition (MA) Reason for Exam: cough with elevated D-dimer FINDINGS: Pulmonary Arteries: Pulmonary arteries are adequately opacified for evaluation. No evidence of intraluminal filling defect to suggest pulmonary embolism. Main pulmonary artery is normal in caliber. Mediastinum: Atherosclerosis of the thoracic aorta and coronary arteries noted. No pericardial effusion. Shotty prominent to mildly enlarged mediastinal and the right hilar lymph nodes, nonspecific and likely reactive. The representative lymph node in the azygoesophageal recess measures 2.7 x 1.5 cm on image number 103 axial series. Lungs/pleura: No pneumothorax. No pleural effusion. Subsegmental consolidation in the right lower lobe. Also, small ill-defined nodular densities in the bilateral lower lobes. These are suggestive of multifocal pneumonia. Upper Abdomen: A 2.2 cm hypodensity in the upper pole of the left kidney, probably a cyst.  Colonic diverticulosis noted. Status post cholecystectomy. Soft Tissues/Bones: Multilevel thoracic spondylosis. No evidence of pulmonary embolism. Multifocal pneumonia bilateral lower lobes, more severe on the right. Prominent to mildly enlarged mediastinal and right hilar lymph nodes, nonspecific and likely reactive.        EKG  EKG Interpretation    Interpreted by me    Rhythm: normal sinus   Rate: normal  Axis: normal  Ectopy: none  Conduction: normal  ST Segments: no acute change  T Waves: no acute change  Q Waves: none    Clinical Impression: no acute changes and normal EKG    All EKG's are interpreted by the Emergency Department Physician who either signs or Co-signs this chart in the absence of a cardiologist.    EMERGENCY DEPARTMENT COURSE:  ED Course as of 05/01/22 0137   [facility] May 01, 2022   0123 SARS-CoV-2, Rapid: Not Detected [DS]      ED Course User Index  [DS] Aimee Romero DO     WBC 17,000, Code S was called. Patient given 1 L of fluids. Lactic acid 1.1 as well as patient maintained normotension so no indication for 30 cc/kilogram bolus as patient demonstrates no signs of septic shock with normotension and lactic acid less than 4. Chest x-ray was unremarkable. UA does show positive nitrite and many bacteria concerning for urinary tract infection. However urinary tract infection does not explain initial hypoxia. D-dimer was elevated so CT PE study was added on. Given fever and hypoxia with productive cough patient was treated for community-acquired pneumonia with Rocephin and azithromycin. Rocephin will also cover urinary tract infection. Patient reassessed after Tylenol and temperature improved to 99.1. Respiratory therapy was paged for breathing treatment given patient does have history of significant secondhand smoke exposure with  smoking for nearly 50 years. COVID and flu test negative. Mild hyponatremia 131, she did receive 1 L of fluid. Spoke with Dr. Cady Juan for admission for sepsis secondary to UTI as well as suspected pneumonia. He accepted admission of the patient. Patient and family updated on plan of care and were agreeable.   CT pulmonary as of study resulted and showed no PE but did demonstrate multifocal pneumonia, patient has already been treated with Rocephin as well as azithromycin to cover for community-acquired pneumonia. Admitted to medicine    PROCEDURES:  None    CONSULTS:  IP CONSULT TO INTERNAL MEDICINE    CRITICAL CARE:  Please see attending note    FINAL IMPRESSION      1. Pneumonia of both lungs due to infectious organism, unspecified part of lung    2. Urinary tract infection in female    3. Septicemia ([facility] [address]          DISPOSITION / PLAN     DISPOSITION Admitted 05/01/2022 01:13:07 AM        PATIENTREFERRED TO:  No follow-up provider specified.     DISCHARGE MEDICATIONS:  New Prescriptions    No medications on file       Douglas Coats DO  EmergencyMedicine Resident    (Please note that portions of this note were completed with a voice recognition program.  Efforts were made to edit the dictations but occasionally words are mis-transcribed.)          Douglas Coats DO  Resident  05/01/22 [id number]

## 2022-05-02 PROBLEM — N30.00 ACUTE CYSTITIS WITHOUT HEMATURIA: Status: ACTIVE | Noted: 2022-05-02

## 2022-05-02 LAB
CULTURE: ABNORMAL
EKG ATRIAL RATE: 83 BPM
EKG P AXIS: 51 DEGREES
EKG P-R INTERVAL: 146 MS
EKG Q-T INTERVAL: 360 MS
EKG QRS DURATION: 76 MS
EKG QTC CALCULATION (BAZETT): 423 MS
EKG R AXIS: 39 DEGREES
EKG T AXIS: 45 DEGREES
EKG VENTRICULAR RATE: 83 BPM
GLUCOSE BLD-MCNC: 263 MG/DL (ref 65–105)
GLUCOSE BLD-MCNC: 281 MG/DL (ref 65–105)
GLUCOSE BLD-MCNC: 288 MG/DL (ref 65–105)
GLUCOSE BLD-MCNC: 316 MG/DL (ref 65–105)
SPECIMEN DESCRIPTION: ABNORMAL

## 2022-05-02 PROCEDURE — 6370000000 HC RX 637 (ALT 250 FOR IP): Performed by: INTERNAL MEDICINE

## 2022-05-02 PROCEDURE — 6370000000 HC RX 637 (ALT 250 FOR IP): Performed by: STUDENT IN AN ORGANIZED HEALTH CARE EDUCATION/TRAINING PROGRAM

## 2022-05-02 PROCEDURE — 99233 SBSQ HOSP IP/OBS HIGH 50: CPT | Performed by: INTERNAL MEDICINE

## 2022-05-02 PROCEDURE — 94640 AIRWAY INHALATION TREATMENT: CPT

## 2022-05-02 PROCEDURE — 6360000002 HC RX W HCPCS: Performed by: STUDENT IN AN ORGANIZED HEALTH CARE EDUCATION/TRAINING PROGRAM

## 2022-05-02 PROCEDURE — 93010 ELECTROCARDIOGRAM REPORT: CPT | Performed by: INTERNAL MEDICINE

## 2022-05-02 PROCEDURE — 82947 ASSAY GLUCOSE BLOOD QUANT: CPT

## 2022-05-02 PROCEDURE — 2060000000 HC ICU INTERMEDIATE R&B

## 2022-05-02 PROCEDURE — 2580000003 HC RX 258: Performed by: STUDENT IN AN ORGANIZED HEALTH CARE EDUCATION/TRAINING PROGRAM

## 2022-05-02 PROCEDURE — 6360000002 HC RX W HCPCS: Performed by: INTERNAL MEDICINE

## 2022-05-02 RX ORDER — GLIPIZIDE 5 MG/1
5 TABLET ORAL
Status: DISCONTINUED | OUTPATIENT
Start: 2022-05-02 | End: 2022-05-03 | Stop reason: HOSPADM

## 2022-05-02 RX ORDER — CLONIDINE HYDROCHLORIDE 0.1 MG/1
0.1 TABLET ORAL DAILY
Status: DISCONTINUED | OUTPATIENT
Start: 2022-05-02 | End: 2022-05-03 | Stop reason: HOSPADM

## 2022-05-02 RX ADMIN — SODIUM CHLORIDE: 9 INJECTION, SOLUTION INTRAVENOUS at 13:31

## 2022-05-02 RX ADMIN — ATORVASTATIN CALCIUM 10 MG: 10 TABLET, FILM COATED ORAL at 10:24

## 2022-05-02 RX ADMIN — VALSARTAN 160 MG: 320 TABLET, FILM COATED ORAL at 10:35

## 2022-05-02 RX ADMIN — LEVOTHYROXINE SODIUM 100 MCG: 0.1 TABLET ORAL at 06:33

## 2022-05-02 RX ADMIN — SODIUM CHLORIDE, PRESERVATIVE FREE 10 ML: 5 INJECTION INTRAVENOUS at 10:24

## 2022-05-02 RX ADMIN — LEVOFLOXACIN 500 MG: 5 INJECTION, SOLUTION INTRAVENOUS at 13:34

## 2022-05-02 RX ADMIN — IPRATROPIUM BROMIDE AND ALBUTEROL SULFATE 1 AMPULE: .5; 2.5 SOLUTION RESPIRATORY (INHALATION) at 21:04

## 2022-05-02 RX ADMIN — SERTRALINE HYDROCHLORIDE 50 MG: 50 TABLET ORAL at 17:11

## 2022-05-02 RX ADMIN — ENOXAPARIN SODIUM 40 MG: 100 INJECTION SUBCUTANEOUS at 10:21

## 2022-05-02 RX ADMIN — INSULIN LISPRO 8 UNITS: 100 INJECTION, SOLUTION INTRAVENOUS; SUBCUTANEOUS at 11:43

## 2022-05-02 RX ADMIN — DONEPEZIL HYDROCHLORIDE 10 MG: 10 TABLET ORAL at 21:55

## 2022-05-02 RX ADMIN — FAMOTIDINE 20 MG: 20 TABLET ORAL at 10:20

## 2022-05-02 RX ADMIN — MEMANTINE 5 MG: 10 TABLET ORAL at 10:20

## 2022-05-02 RX ADMIN — CLONIDINE HYDROCHLORIDE 0.1 MG: 0.1 TABLET ORAL at 23:07

## 2022-05-02 RX ADMIN — METHYLPREDNISOLONE SODIUM SUCCINATE 40 MG: 40 INJECTION, POWDER, FOR SOLUTION INTRAMUSCULAR; INTRAVENOUS at 10:24

## 2022-05-02 RX ADMIN — AMLODIPINE BESYLATE 5 MG: 5 TABLET ORAL at 10:19

## 2022-05-02 RX ADMIN — SODIUM CHLORIDE, PRESERVATIVE FREE 10 ML: 5 INJECTION INTRAVENOUS at 21:55

## 2022-05-02 RX ADMIN — METFORMIN HYDROCHLORIDE 500 MG: 500 TABLET ORAL at 17:10

## 2022-05-02 RX ADMIN — INSULIN LISPRO 6 UNITS: 100 INJECTION, SOLUTION INTRAVENOUS; SUBCUTANEOUS at 10:20

## 2022-05-02 RX ADMIN — ATENOLOL 50 MG: 50 TABLET ORAL at 10:19

## 2022-05-02 RX ADMIN — GLIPIZIDE 5 MG: 5 TABLET ORAL at 17:10

## 2022-05-02 RX ADMIN — CLOPIDOGREL BISULFATE 75 MG: 75 TABLET ORAL at 10:19

## 2022-05-02 ASSESSMENT — PAIN SCALES - GENERAL
PAINLEVEL_OUTOF10: 0

## 2022-05-02 NOTE — H&P
History and Physical Service  [facility] Internal Medicine    HISTORY AND PHYSICAL EXAMINATION            Date:   5/2/2022  Patient name:  Denny Stroud  MRN:   [id number]  Account:  [de-identified]  YOB: 1945  PCP:    James Carter MD  Code Status:    Full Code    Chief Complaint:   FEVER COUGH     History Obtained From:     Patient ,medical record ,nursing staff    History of Present Illnes       The patient is a 78 y.o.  /  female who presents   Denny Stroud is a 78 y.o. female with past medical history of diabetes, hyperlipidemia, hypertension, CAD status post 1 stenting who presents with family members for complaints of fever, myalgias, cough and shortness of breath. Symptoms have been ongoing for the last 7 days however has been progressively worsening. Patient has a cough productive of yellow sputum. No known COVID-19 or other sick contacts at home. Patient states she is visiting from [place] as she has family members in the area. Denies chest pain, nausea, vomiting. Upon initial evaluation patient is hypoxic at 84% on room air, no prior history of requiring oxygen. HAS PYURIA AND GRAM NEG RODS ON URINE CULTURE   DEBORAH BASILAR PNEUMONIA   Multifocal pneumonia bilateral lower lobes, more severe on the right. Past Medical History:   Diagnosis Date    Diabetes mellitus ([address]     Hyperlipidemia     Hypertension     Hypothyroidism     MDRO (multiple drug resistant organisms) resistance 3/30/2015    E. Coli urine    Thyroid disease     Type II or unspecified type diabetes mellitus without mention of complication, not stated as uncontrolled         Past Surgical History:     Past Surgical History:   Procedure Laterality Date    BACK SURGERY      BACK SURGERY      pinched  nerve    CHOLECYSTECTOMY          Medications Prior to Admission:     Prior to Admission medications    Medication Sig Start Date End Date Taking?  Authorizing Provider   zolpidem (AMBIEN) 10 MG tablet 10 mg. Historical Provider, MD   famotidine (PEPCID) 20 MG tablet famotidine 20 mg tablet    Historical Provider, MD   glimepiride (AMARYL) 2 MG tablet glimepiride 2 mg tablet    Historical Provider, MD   cloNIDine (CATAPRES) 0.1 MG tablet clonidine HCl 0.1 mg tablet    Historical Provider, MD   gabapentin (NEURONTIN) 800 MG tablet gabapentin 800 mg tablet    Historical Provider, MD   donepezil (ARICEPT) 10 MG tablet donepezil 10 mg tablet    Historical Provider, MD   sertraline (ZOLOFT) 50 MG tablet sertraline 50 mg tablet    Historical Provider, MD   clopidogrel (PLAVIX) 75 MG tablet clopidogrel 75 mg tablet    Historical Provider, MD   atenolol (TENORMIN) 50 MG tablet atenolol 50 mg tablet    Historical Provider, MD   memantine (NAMENDA) 5 MG tablet memantine 5 mg tablet    Historical Provider, MD   cephALEXin (KEFLEX) 500 MG capsule Take 1 capsule by mouth 2 times daily. 4/2/15   Kasie Winkler MD   metFORMIN (GLUCOPHAGE) 500 MG tablet Take 500 mg by mouth 2 times daily (with meals). Historical Provider, MD   amLODIPine (NORVASC) 5 MG tablet Take 5 mg by mouth daily. Historical Provider, MD   valsartan (DIOVAN) 160 MG tablet Take 160 mg by mouth daily. Historical Provider, MD   levothyroxine (SYNTHROID) 100 MCG tablet Take 100 mcg by mouth Daily. Historical Provider, MD   simvastatin (ZOCOR) 20 MG tablet Take 20 mg by mouth nightly. Historical Provider, MD   levothyroxine (SYNTHROID) 100 MCG tablet Take 100 mcg by mouth Daily. Historical Provider, MD   amLODIPine (NORVASC) 5 MG tablet Take 5 mg by mouth daily. Historical Provider, MD   valsartan (DIOVAN) 160 MG tablet Take 160 mg by mouth daily. Historical Provider, MD   metFORMIN (GLUCOPHAGE) 500 MG tablet Take 500 mg by mouth 2 times daily (with meals). Historical Provider, MD   ondansetron (ZOFRAN) 4 MG tablet Take 1 tablet by mouth every 8 hours as needed for Nausea.  2/6/15   Tito Gordon DO Allergies:     Patient has no known allergies. Social History:     Tobacco:    reports that she has never smoked. She has never used smokeless tobacco.  Alcohol:      reports no history of alcohol use. Drug Use:  reports no history of drug use. Family History:     Family History   Problem Relation Age of Onset   CharleneCurly Cancer Mother         colon    Other Father         Emphysema    Cancer Brother         colon       Review of Systems:     Misael Pedroza ,  ROS     SEE HPI          Respiratory ROS:            Negative for cough,                                              Negative for shortness of breath . Negative for wheezing ,     Cardiovascular ROS:     Negative for chest pain,                                             Negative for palpitations . Negative for worsening or new leg edema . Gastrointestinal ROS:   Negative for abdominal pain . Negative for change in bowel habits . Ashley Money Dermatological ROS:      Negative for rash,                                            .  ==============                                                       Physical Exam:         Physical Exam   Vitals:    Vitals:    05/01/22 1945 05/01/22 2330 05/02/22 0730 05/02/22 1215   BP: (!) 147/45 (!) 144/85 (!) 148/62 (!) 116/96   Pulse: 71 61 56 65   Resp: 18 18 16 18   Temp: 99 °F (37.2 °C) 98 °F (36.7 °C) 97.8 °F (36.6 °C) 98.2 °F (36.8 °C)   TempSrc: Oral Oral Oral Oral   SpO2: 99% 96% 98% 99%   Weight:  189 lb 8 oz (86 kg)     Height:                        Body mass index is 38.27 kg/m².      General Appearance:   Alert , CO-OPERATIVE ,                 Skin:                             No rash or erythema  HEENT ;                           Head                        Symmetrical , within normal limits Eye                           Conjunctiva normal ,, sclera non-icteric . Ear                           External ear ok . Neck:                            No mass , no thyroid enlargement                                           Pulmonary/Chest:        Clear to auscultation bilaterally . No wheezes, rales or rhonchi . No abnormality on percussion                                                        Cardiovascular:            Normal rate, regular rhythm,                                          No murmur or  Gallop . Abdomen:                       Soft, non-tender                                           Normal bowels sounds,                                             Extremities:                    No  Edema .                                            Neurological ;                 No focal motor deficit ,                 No focal sensory deficit ,    Musculo-skeletal ;                  No  gait abnormality                  No significant joint abnormality,                   Psych:   see Psych notect ,                                                                                           Investigations:      Laboratory Testing:  Recent Results (from the past 24 hour(s))   POC Glucose Fingerstick    Collection Time: 05/01/22  8:04 PM   Result Value Ref Range    POC Glucose 354 (H) 65 - 105 mg/dL   POC Glucose Fingerstick    Collection Time: 05/02/22  6:29 AM   Result Value Ref Range    POC Glucose 263 (H) 65 - 105 mg/dL   POC Glucose Fingerstick    Collection Time: 05/02/22 10:59 AM   Result Value Ref Range    POC Glucose 316 (H) 65 - 105 mg/dL       Recent Labs     04/30/22  2227   HGB 12.5   HCT 37.4   WBC 17.1*   MCV 81.7   *   K 4.1   CL 97*   CO2 22   BUN 13   CREATININE 0.73   GLUCOSE 167*   INR 1.1   PROTIME 14.6 APTT 28.1   AST 22   ALT 12   LABALBU 3.9       Hematology:  Recent Labs     04/30/22 2227   WBC 17.1*   RBC 4.58   HGB 12.5   HCT 37.4   MCV 81.7   MCH 27.3   MCHC 33.4   RDW 16.2*      MPV 7.9   INR 1.1   DDIMER 1.69*     Chemistry:  Recent Labs     04/30/22 2227   *   K 4.1   CL 97*   CO2 22   GLUCOSE 167*   BUN 13   CREATININE 0.73   ANIONGAP 12   LABGLOM >60   GFRAA >60   CALCIUM 8.9   PROBNP 912*     Recent Labs     04/30/22 2227 05/01/22  0604 05/01/22  1124 05/01/22 2004 05/02/22 0629 05/02/22  1059   PROT 7.8  --   --   --   --   --    LABALBU 3.9  --   --   --   --   --    AST 22  --   --   --   --   --    ALT 12  --   --   --   --   --    ALKPHOS 139*  --   --   --   --   --    BILITOT 0.55  --   --   --   --   --    POCGLU  --  157* 195* 354* 263* 316*       Imaging/Diagnostics:       XR CHEST PORTABLE    Result Date: 4/30/2022  EXAMINATION: ONE XRAY VIEW OF THE CHEST 4/30/2022 10:24 pm COMPARISON: 02/06/2015 HISTORY: ORDERING SYSTEM PROVIDED HISTORY: hypoxia, fever, rhonchi b/l TECHNOLOGIST PROVIDED HISTORY: hypoxia, fever, rhonchi b/l Reason for Exam: hypoxia, fever, rhonchi b/l Additional signs and symptoms: cough FINDINGS: The patient is rotated to the right on the portable study. Heart size is within normal limits for the portable technique and the lungs are grossly clear. There is no pneumothorax or pleural fluid. Right shoulder reverse prosthesis. Prior left shoulder surgery. No acute bone finding. No acute cardiopulmonary disease. CT CHEST PULMONARY EMBOLISM W CONTRAST    Result Date: 5/1/2022  EXAMINATION: CTA OF THE CHEST 5/1/2022 12:09 am TECHNIQUE: CTA of the chest was performed after the administration of intravenous contrast.  Multiplanar reformatted images are provided for review. MIP images are provided for review.  Dose modulation, iterative reconstruction, and/or weight based adjustment of the mA/kV was utilized to reduce the radiation dose to as low as reasonably achievable. COMPARISON: None. HISTORY: ORDERING SYSTEM PROVIDED HISTORY: elevated d-dimer, hypoxic TECHNOLOGIST PROVIDED HISTORY: elevated d-dimer, hypoxic Decision Support Exception - unselect if not a suspected or confirmed emergency medical condition->Emergency Medical Condition (MA) Reason for Exam: cough with elevated D-dimer FINDINGS: Pulmonary Arteries: Pulmonary arteries are adequately opacified for evaluation. No evidence of intraluminal filling defect to suggest pulmonary embolism. Main pulmonary artery is normal in caliber. Mediastinum: Atherosclerosis of the thoracic aorta and coronary arteries noted. No pericardial effusion. Shotty prominent to mildly enlarged mediastinal and the right hilar lymph nodes, nonspecific and likely reactive. The representative lymph node in the azygoesophageal recess measures 2.7 x 1.5 cm on image number 103 axial series. Lungs/pleura: No pneumothorax. No pleural effusion. Subsegmental consolidation in the right lower lobe. Also, small ill-defined nodular densities in the bilateral lower lobes. These are suggestive of multifocal pneumonia. Upper Abdomen: A 2.2 cm hypodensity in the upper pole of the left kidney, probably a cyst.  Colonic diverticulosis noted. Status post cholecystectomy. Soft Tissues/Bones: Multilevel thoracic spondylosis. No evidence of pulmonary embolism. Multifocal pneumonia bilateral lower lobes, more severe on the right. Prominent to mildly enlarged mediastinal and right hilar lymph nodes, nonspecific and likely reactive.           Current Facility-Administered Medications   Medication Dose Route Frequency Provider Last Rate Last Admin    sodium chloride flush 0.9 % injection 10 mL  10 mL IntraVENous PRN Joyce Quintero, DO   10 mL at 05/01/22 0048    sodium chloride flush 0.9 % injection 5-40 mL  5-40 mL IntraVENous 2 times per day Joyce Quintero DO   10 mL at 05/02/22 1024    sodium chloride flush 0.9 % injection 5-40 mL 5-40 mL IntraVENous PRN [facility], DO        0.9 % sodium chloride infusion   IntraVENous PRN [facility], DO 10 mL/hr at 05/02/22 1331 New Bag at 05/02/22 1331    enoxaparin (LOVENOX) injection 40 mg  40 mg SubCUTAneous Daily [facility], DO   40 mg at 05/02/22 1021    acetaminophen (TYLENOL) tablet 650 mg  650 mg Oral Q4H PRN [facility], DO        ondansetron (ZOFRAN-ODT) disintegrating tablet 4 mg  4 mg Oral Q8H PRN [facility], DO        Or    ondansetron [facility]) injection 4 mg  4 mg IntraVENous Q6H PRN [facility], DO        amLODIPine (NORVASC) tablet 5 mg  5 mg Oral Daily [facility], DO   5 mg at 05/02/22 1019    clopidogrel (PLAVIX) tablet 75 mg  75 mg Oral Daily [facility], DO   75 mg at 05/02/22 1019    donepezil (ARICEPT) tablet 10 mg  10 mg Oral Nightly [facility], DO   10 mg at 05/01/22 2049    famotidine (PEPCID) tablet 20 mg  20 mg Oral Daily [facility], DO   20 mg at 05/02/22 1020    levothyroxine (SYNTHROID) tablet 100 mcg  100 mcg Oral Daily [facility], DO   100 mcg at 05/02/22 9694    memantine (NAMENDA) tablet 5 mg  5 mg Oral Daily [facility], DO   5 mg at 05/02/22 1020    atorvastatin (LIPITOR) tablet 10 mg  10 mg Oral Daily [facility], DO   10 mg at 05/02/22 1024    valsartan (DIOVAN) tablet 160 mg  160 mg Oral Daily [facility], DO   160 mg at 05/02/22 1035    atenolol (TENORMIN) tablet 50 mg  50 mg Oral Daily [facility], DO   50 mg at 05/02/22 1019    ipratropium-albuterol (DUONEB) nebulizer solution 1 ampule  1 ampule Inhalation Q4H PRN [facility], DO   1 ampule at 05/01/22 0059    levoFLOXacin (LEVAQUIN) 500 MG/100ML infusion 500 mg  500 mg IntraVENous Q24H Socorro Ortiz  mL/hr at 05/02/22 1334 500 mg at 05/02/22 1334    methylPREDNISolone sodium (SOLU-MEDROL) injection 40 mg  40 mg IntraVENous Daily Socorro Ortiz MD   40 mg at 05/02/22 1024    insulin lispro (HUMALOG) injection vial 0-12 Units  0-12 Units SubCUTAneous TID  oNna Kingston MD   8 Units at 05/02/22 1143    insulin lispro (HUMALOG) injection vial 0-6 Units  0-6 Units SubCUTAneous Nightly Nona Kingston MD   5 Units at 05/01/22 2109    glucose (GLUTOSE) 40 % oral gel 15 g  15 g Oral PRN Nona Kingston MD        dextrose 50 % IV solution  12.5 g IntraVENous PRN Nona Kingston MD        glucagon (rDNA) injection 1 mg  1 mg IntraMUSCular PRN Nona Kingston MD        dextrose 5 % solution  100 mL/hr IntraVENous PRN Nona Kingston MD         Impressions :      1. Principal Problem:    Pneumonia of both lower lobes due to infectious organism  Active Problems: Morbid obesity ([facility] [address]    Diabetes ([facility] [address]    HTN (hypertension)    Hyperlipidemia    Hypothyroidism  Resolved Problems:    * No resolved hospital problems. *        2.  has a past medical history of Diabetes mellitus ([facility] [address] Hyperlipidemia, Hypertension, Hypothyroidism, MDRO (multiple drug resistant organisms) resistance (3/30/2015), Thyroid disease, and Type II or unspecified type diabetes mellitus without mention of complication, not stated as uncontrolled. Plans:     1 HAS BILATERAL BASILAR PNEUMONIA AND UTI  DM   HYPERGLYCEMIA     Medications:      Allergies:  No Known Allergies    Current Meds:   Scheduled Meds:    glipiZIDE  5 mg Oral BID AC    metFORMIN  500 mg Oral BID     sertraline  50 mg Oral Daily    sodium chloride flush  5-40 mL IntraVENous 2 times per day    enoxaparin  40 mg SubCUTAneous Daily    amLODIPine  5 mg Oral Daily    clopidogrel  75 mg Oral Daily    donepezil  10 mg Oral Nightly    famotidine  20 mg Oral Daily    levothyroxine  100 mcg Oral Daily    memantine  5 mg Oral Daily    atorvastatin  10 mg Oral Daily    valsartan  160 mg Oral Daily    atenolol  50 mg Oral Daily    levofloxacin  500 mg IntraVENous Q24H     Continuous Infusions:    sodium chloride 10 mL/hr at 05/02/22 1331    dextrose       PRN Meds: sodium chloride flush, sodium chloride flush, sodium chloride, acetaminophen, ondansetron **OR** ondansetron, ipratropium-albuterol, glucose, dextrose, glucagon (rDNA), dextrose          Joshua Cronin MD  5/2/2022  3:07 PM

## 2022-05-02 NOTE — FLOWSHEET NOTE
05/02/22 1539   Encounter Summary   Encounter Overview/Reason   Encounter   Service Provided For: Patient   Referral/Consult From: Rounding   Last Encounter  05/02/22   Complexity of Encounter Low   Spiritual/Emotional needs   Type Spiritual Support   Rituals, Rites and Sacraments   Type Anointing  (Lorrie Arellano 5-2-22)

## 2022-05-02 NOTE — CARE COORDINATION
ONGOING DISCHARGE PLAN:    Patient is alert and oriented x4. Spoke with patient regarding discharge plan and patient confirms that plan is still to go home with family. IV Levaquin, IV solumedrol 40 mg QD    Will continue to follow for additional discharge needs.     Electronically signed by Austin Bain RN on 5/2/2022 at 3:04 PM

## 2022-05-02 NOTE — H&P
[facility]  Meño Spear MD; Yousif Osborne MD; Gilmer Levy MD; MD Sp Chavarria MD; MD PHILIP [facility]   [facility]    HISTORY AND PHYSICAL EXAMINATION            Date:   5/1/2022  Patient name:  Maxx Hernandez  Date of admission:  4/30/2022 10:10 PM  MRN:   [id number]  Account:  [de-identified]  YOB: 1945  PCP:    Yojana Garcias MD  Room:   2095/2095-01  Code Status:    Full Code    Chief Complaint:     Chief Complaint   Patient presents with    Fever   sob     History Obtained From:     patient    History of Present Illness:     Maxx Hernandez is a 78 y.o.  /  female who presents with Fever   and is admitted to the hospital for the management of Pneumonia. 74-year-old female with underlying history of diabetes, hyperlipidemia, hypertension, coronary disease with 1 stent who presents with fever, cough, shortness of breath for last several days, coughing up some yellowish sputum, chest x-ray showed pneumonia, her oxygen saturations in the ER was 84%, hypoxic,      Past Medical History:     Past Medical History:   Diagnosis Date    Diabetes mellitus ([address]     Hyperlipidemia     Hypertension     Hypothyroidism     MDRO (multiple drug resistant organisms) resistance 3/30/2015    E. Coli urine    Thyroid disease     Type II or unspecified type diabetes mellitus without mention of complication, not stated as uncontrolled         Past Surgical History:     Past Surgical History:   Procedure Laterality Date    BACK SURGERY      BACK SURGERY      pinched  nerve    CHOLECYSTECTOMY          Medications Prior to Admission:     Prior to Admission medications    Medication Sig Start Date End Date Taking? Authorizing Provider   zolpidem (AMBIEN) 10 MG tablet 10 mg.     Historical Provider, MD   famotidine (PEPCID) 20 MG tablet famotidine 20 mg tablet Historical Provider, MD   glimepiride (AMARYL) 2 MG tablet glimepiride 2 mg tablet    Historical Provider, MD   cloNIDine (CATAPRES) 0.1 MG tablet clonidine HCl 0.1 mg tablet    Historical Provider, MD   gabapentin (NEURONTIN) 800 MG tablet gabapentin 800 mg tablet    Historical Provider, MD   donepezil (ARICEPT) 10 MG tablet donepezil 10 mg tablet    Historical Provider, MD   sertraline (ZOLOFT) 50 MG tablet sertraline 50 mg tablet    Historical Provider, MD   clopidogrel (PLAVIX) 75 MG tablet clopidogrel 75 mg tablet    Historical Provider, MD   atenolol (TENORMIN) 50 MG tablet atenolol 50 mg tablet    Historical Provider, MD   memantine (NAMENDA) 5 MG tablet memantine 5 mg tablet    Historical Provider, MD   cephALEXin (KEFLEX) 500 MG capsule Take 1 capsule by mouth 2 times daily. 4/2/15   Diaz Webb MD   metFORMIN (GLUCOPHAGE) 500 MG tablet Take 500 mg by mouth 2 times daily (with meals). Historical Provider, MD   amLODIPine (NORVASC) 5 MG tablet Take 5 mg by mouth daily. Historical Provider, MD   valsartan (DIOVAN) 160 MG tablet Take 160 mg by mouth daily. Historical Provider, MD   levothyroxine (SYNTHROID) 100 MCG tablet Take 100 mcg by mouth Daily. Historical Provider, MD   simvastatin (ZOCOR) 20 MG tablet Take 20 mg by mouth nightly. Historical Provider, MD   levothyroxine (SYNTHROID) 100 MCG tablet Take 100 mcg by mouth Daily. Historical Provider, MD   amLODIPine (NORVASC) 5 MG tablet Take 5 mg by mouth daily. Historical Provider, MD   valsartan (DIOVAN) 160 MG tablet Take 160 mg by mouth daily. Historical Provider, MD   metFORMIN (GLUCOPHAGE) 500 MG tablet Take 500 mg by mouth 2 times daily (with meals). Historical Provider, MD   ondansetron (ZOFRAN) 4 MG tablet Take 1 tablet by mouth every 8 hours as needed for Nausea. 2/6/15   Lakisha Cline DO        Allergies:     Patient has no known allergies. Social History:     Tobacco:    reports that she has never smoked.  She has never used smokeless tobacco.  Alcohol:      reports no history of alcohol use. Drug Use:  reports no history of drug use. Family History:     Family History   Problem Relation Age of Onset   Floydene Ada Cancer Mother         colon    Other Father         Emphysema    Cancer Brother         colon       Review of Systems:     Positive and Negative as described in HPI.     CONSTITUTIONAL:  negative for fevers, chills, sweats, fatigue, weight loss  HEENT:  negative for vision, hearing changes, runny nose, throat pain  RESPIRATORY: Positive for shortness of breath, cough, congestion, wheezing  CARDIOVASCULAR:  negative for chest pain, palpitations  GASTROINTESTINAL:  negative for nausea, vomiting, diarrhea, constipation, change in bowel habits, abdominal pain   GENITOURINARY:  negative for difficulty of urination, burning with urination, frequency   INTEGUMENT:  negative for rash, skin lesions, easy bruising   HEMATOLOGIC/LYMPHATIC:  negative for swelling/edema   ALLERGIC/IMMUNOLOGIC:  negative for urticaria , itching  ENDOCRINE:  negative increase in drinking, increase in urination, hot or cold intolerance  MUSCULOSKELETAL:  negative joint pains, muscle aches, swelling of joints  NEUROLOGICAL:  negative for headaches, dizziness, lightheadedness, numbness, pain, tingling extremities  BEHAVIOR/PSYCH:  negative for depression, anxiety    Physical Exam:   BP (!) 147/45   Pulse 71   Temp 99 °F (37.2 °C) (Oral)   Resp 18   Ht 4' 11\" (1.499 m)   Wt 200 lb (90.7 kg)   SpO2 99%   BMI 40.40 kg/m²   Temp (24hrs), Av.4 °F (37.4 °C), Min:98.1 °F (36.7 °C), Max:103 °F (39.4 °C)    Recent Labs     22  0604 22  1124 22   POCGLU 157* 195* 354*       Intake/Output Summary (Last 24 hours) at 2022  Last data filed at 2022 1803  Gross per 24 hour   Intake 2294.53 ml   Output --   Net 2294.53 ml       General Appearance: alert, well appearing, and in no acute distress  Mental status: oriented to person, place, and time  Head: normocephalic, atraumatic  Eye: no icterus, redness, pupils equal and reactive, extraocular eye movements intact, conjunctiva clear  Ear: normal external ear, no discharge, hearing intact  Nose: no drainage noted  Mouth: mucous membranes moist  Neck: supple, no carotid bruits, thyroid not palpable  Lungs: Bilateral equal air entry, coarse breath sounds  Cardiovascular: normal rate, regular rhythm, no murmur, gallop, rub  Abdomen: Soft, nontender, nondistended, normal bowel sounds, no hepatomegaly or splenomegaly  Neurologic: There are no new focal motor or sensory deficits, normal muscle tone and bulk, no abnormal sensation, normal speech, cranial nerves II through XII grossly intact  Skin: No gross lesions, rashes, bruising or bleeding on exposed skin area  Extremities: peripheral pulses palpable, no pedal edema or calf pain with palpation  Psych: normal affect    Investigations:      Laboratory Testing:  Recent Results (from the past 24 hour(s))   CBC with Auto Differential    Collection Time: 04/30/22 10:27 PM   Result Value Ref Range    WBC 17.1 (H) 3.5 - 11.0 k/uL    RBC 4.58 4.0 - 5.2 m/uL    Hemoglobin 12.5 12.0 - 16.0 g/dL    Hematocrit 37.4 36 - 46 %    MCV 81.7 80 - 100 fL    MCH 27.3 26 - 34 pg    MCHC 33.4 31 - 37 g/dL    RDW 16.2 (H) 11.5 - 14.9 %    Platelets 983 890 - 755 k/uL    MPV 7.9 6.0 - 12.0 fL    Seg Neutrophils 69 (H) 36 - 66 %    Lymphocytes 11 (L) 24 - 44 %    Monocytes 8 (H) 1 - 7 %    Eosinophils % 1 0 - 4 %    Basophils 0 0 - 2 %    Bands 11 (H) 0 - 10 %    Segs Absolute 11.80 (H) 1.3 - 9.1 k/uL    Absolute Lymph # 1.88 1.0 - 4.8 k/uL    Absolute Mono # 1.37 (H) 0.1 - 1.3 k/uL    Absolute Eos # 0.17 0.0 - 0.4 k/uL    Basophils Absolute 0.00 0.0 - 0.2 k/uL    Absolute Bands # 1.88 (H) 0.0 - 1.0 k/uL    Morphology ANISOCYTOSIS PRESENT    Comprehensive Metabolic Panel w/ Reflex to MG    Collection Time: 04/30/22 10:27 PM   Result Value Ref Range    Glucose 167 (H) 70 - 99 mg/dL    BUN 13 8 - 23 mg/dL    CREATININE 0.73 0.50 - 0.90 mg/dL    Calcium 8.9 8.6 - 10.4 mg/dL    Sodium 131 (L) 135 - 144 mmol/L    Potassium 4.1 3.7 - 5.3 mmol/L    Chloride 97 (L) 98 - 107 mmol/L    CO2 22 20 - 31 mmol/L    Anion Gap 12 9 - 17 mmol/L    Alkaline Phosphatase 139 (H) 35 - 104 U/L    ALT 12 5 - 33 U/L    AST 22 <32 U/L    Total Bilirubin 0.55 0.3 - 1.2 mg/dL    Total Protein 7.8 6.4 - 8.3 g/dL    Albumin 3.9 3.5 - 5.2 g/dL    GFR Non-African American >60 >60 mL/min    GFR African American >60 >60 mL/min    GFR Comment         Lactate, Sepsis    Collection Time: 04/30/22 10:27 PM   Result Value Ref Range    Lactic Acid, Sepsis 1.1 0.5 - 1.9 mmol/L   Culture, Blood 2    Collection Time: 04/30/22 10:27 PM    Specimen: Blood   Result Value Ref Range    Specimen Description . BLOOD LEFT WRIST     Culture NO GROWTH 12 HOURS    APTT    Collection Time: 04/30/22 10:27 PM   Result Value Ref Range    PTT 28.1 24.0 - 36.0 sec   Protime-INR    Collection Time: 04/30/22 10:27 PM   Result Value Ref Range    Protime 14.6 11.8 - 14.6 sec    INR 1.1    D-Dimer, Quantitative    Collection Time: 04/30/22 10:27 PM   Result Value Ref Range    D-Dimer, Quant 1.69 (H) 0.00 - 0.59 mg/L FEU   Troponin    Collection Time: 04/30/22 10:27 PM   Result Value Ref Range    Troponin, High Sensitivity 9 0 - 14 ng/L   Brain Natriuretic Peptide    Collection Time: 04/30/22 10:27 PM   Result Value Ref Range    Pro- (H) <300 pg/mL   Culture, Blood 1    Collection Time: 04/30/22 10:45 PM    Specimen: Blood   Result Value Ref Range    Specimen Description . BLOOD     Special Requests LEFT FOREARM     Culture NO GROWTH <24 HRS    COVID-19, Rapid    Collection Time: 04/30/22 10:55 PM    Specimen: Nasopharyngeal Swab   Result Value Ref Range    Specimen Description . NASOPHARYNGEAL SWAB     SARS-CoV-2, Rapid Not Detected Not Detected   Rapid influenza A/B antigens    Collection Time: 04/30/22 10:55 PM    Specimen: Nasopharyngeal   Result Value Ref Range    Flu A Antigen NEGATIVE NEGATIVE    Flu B Antigen NEGATIVE NEGATIVE   Urinalysis    Collection Time: 04/30/22 11:44 PM   Result Value Ref Range    Color, UA Yellow Yellow    Turbidity UA Cloudy (A) Clear    Glucose, Ur NEGATIVE NEGATIVE    Bilirubin Urine NEGATIVE NEGATIVE    Ketones, Urine NEGATIVE NEGATIVE    Specific Gravity, UA 1.023 1.000 - 1.030    Urine Hgb MOD (A) NEGATIVE    pH, UA 6.0 5.0 - 8.0    Protein, UA 3+ (A) NEGATIVE    Urobilinogen, Urine Normal Normal    Nitrite, Urine POSITIVE (A) NEGATIVE    Leukocyte Esterase, Urine MOD (A) NEGATIVE   Microscopic Urinalysis    Collection Time: 04/30/22 11:44 PM   Result Value Ref Range    -          WBC, UA TOO NUMEROUS TO COUNT /HPF    RBC, UA 6 TO 9 /HPF    Epithelial Cells UA 21 TO 50 /HPF    Bacteria, UA MANY (A) None   Culture, Urine    Collection Time: 04/30/22 11:45 PM    Specimen: Urine, clean catch   Result Value Ref Range    Specimen Description . CLEAN CATCH URINE     Culture SPECIMEN RECEIVED    POC Glucose Fingerstick    Collection Time: 05/01/22  6:04 AM   Result Value Ref Range    POC Glucose 157 (H) 65 - 105 mg/dL   POC Glucose Fingerstick    Collection Time: 05/01/22 11:24 AM   Result Value Ref Range    POC Glucose 195 (H) 65 - 105 mg/dL   POC Glucose Fingerstick    Collection Time: 05/01/22  8:04 PM   Result Value Ref Range    POC Glucose 354 (H) 65 - 105 mg/dL       Imaging/Diagnostics:  XR CHEST PORTABLE    Result Date: 4/30/2022  No acute cardiopulmonary disease. CT CHEST PULMONARY EMBOLISM W CONTRAST    Result Date: 5/1/2022  No evidence of pulmonary embolism. Multifocal pneumonia bilateral lower lobes, more severe on the right. Prominent to mildly enlarged mediastinal and right hilar lymph nodes, nonspecific and likely reactive.        Assessment :      Hospital Problems           Last Modified POA    * (Principal) Pneumonia 5/1/2022 Yes    Morbid obesity ([address] 5/1/2022 Yes    Diabetes ([address] 5/1/2022 Yes    HTN (hypertension) 5/1/2022 Yes    Hyperlipidemia 5/1/2022 Yes    Hypothyroidism 5/1/2022 Yes          Plan:     Patient status inpatient in the Progressive Unit/Step down    1. CAP/SIRS , abx, resp aerosols , cultures pending   2. Dm2, sugars Ac&HS   3. HTN  , home meds   4. Hypothyroidism, home meds   5. MO,low sabina diet   6. DVT PPx   7. Full code status     Consultations:   IP CONSULT TO INTERNAL MEDICINE     Patient is admitted as inpatient status because of co-morbidities listed above, severity of signs and symptoms as outlined, requirement for current medical therapies and most importantly because of direct risk to patient if care not provided in a hospital setting. Expected length of stay > 48 hours. Rubén Boyd MD  5/1/2022  8:34 PM    Copy sent to Dr. Sj Middleton MD    Please note that this chart was generated using voice recognition Dragon dictation software. Although every effort was made to ensure the accuracy of this automated transcription, some errors in transcription may have occurred.

## 2022-05-03 ENCOUNTER — APPOINTMENT (OUTPATIENT)
Dept: GENERAL RADIOLOGY | Age: 77
DRG: 139 | End: 2022-05-03
Payer: MEDICAID

## 2022-05-03 VITALS
DIASTOLIC BLOOD PRESSURE: 55 MMHG | SYSTOLIC BLOOD PRESSURE: 155 MMHG | RESPIRATION RATE: 18 BRPM | OXYGEN SATURATION: 96 % | TEMPERATURE: 97.7 F | HEIGHT: 59 IN | WEIGHT: 189.5 LBS | HEART RATE: 60 BPM | BODY MASS INDEX: 38.2 KG/M2

## 2022-05-03 LAB
GLUCOSE BLD-MCNC: 142 MG/DL (ref 65–105)
GLUCOSE BLD-MCNC: 150 MG/DL (ref 65–105)
GLUCOSE BLD-MCNC: 214 MG/DL (ref 65–105)

## 2022-05-03 PROCEDURE — 6360000002 HC RX W HCPCS: Performed by: INTERNAL MEDICINE

## 2022-05-03 PROCEDURE — 99239 HOSP IP/OBS DSCHRG MGMT >30: CPT | Performed by: INTERNAL MEDICINE

## 2022-05-03 PROCEDURE — 6370000000 HC RX 637 (ALT 250 FOR IP): Performed by: STUDENT IN AN ORGANIZED HEALTH CARE EDUCATION/TRAINING PROGRAM

## 2022-05-03 PROCEDURE — 6370000000 HC RX 637 (ALT 250 FOR IP): Performed by: INTERNAL MEDICINE

## 2022-05-03 PROCEDURE — 2580000003 HC RX 258: Performed by: STUDENT IN AN ORGANIZED HEALTH CARE EDUCATION/TRAINING PROGRAM

## 2022-05-03 PROCEDURE — 6360000002 HC RX W HCPCS: Performed by: STUDENT IN AN ORGANIZED HEALTH CARE EDUCATION/TRAINING PROGRAM

## 2022-05-03 PROCEDURE — 82947 ASSAY GLUCOSE BLOOD QUANT: CPT

## 2022-05-03 PROCEDURE — 71046 X-RAY EXAM CHEST 2 VIEWS: CPT

## 2022-05-03 RX ORDER — GLIPIZIDE 5 MG/1
5 TABLET ORAL
Qty: 60 TABLET | Refills: 3 | Status: SHIPPED | OUTPATIENT
Start: 2022-05-03

## 2022-05-03 RX ORDER — ATORVASTATIN CALCIUM 10 MG/1
10 TABLET, FILM COATED ORAL DAILY
Qty: 30 TABLET | Refills: 3 | Status: SHIPPED | OUTPATIENT
Start: 2022-05-04

## 2022-05-03 RX ORDER — LEVOFLOXACIN 500 MG/1
500 TABLET, FILM COATED ORAL DAILY
Qty: 5 TABLET | Refills: 0 | Status: SHIPPED | OUTPATIENT
Start: 2022-05-03 | End: 2022-05-08

## 2022-05-03 RX ADMIN — ATENOLOL 50 MG: 50 TABLET ORAL at 09:13

## 2022-05-03 RX ADMIN — LEVOFLOXACIN 500 MG: 5 INJECTION, SOLUTION INTRAVENOUS at 14:36

## 2022-05-03 RX ADMIN — VALSARTAN 160 MG: 320 TABLET, FILM COATED ORAL at 09:12

## 2022-05-03 RX ADMIN — CLONIDINE HYDROCHLORIDE 0.1 MG: 0.1 TABLET ORAL at 09:12

## 2022-05-03 RX ADMIN — FAMOTIDINE 20 MG: 20 TABLET ORAL at 09:12

## 2022-05-03 RX ADMIN — GLIPIZIDE 5 MG: 5 TABLET ORAL at 09:12

## 2022-05-03 RX ADMIN — MEMANTINE 5 MG: 10 TABLET ORAL at 09:13

## 2022-05-03 RX ADMIN — ENOXAPARIN SODIUM 40 MG: 100 INJECTION SUBCUTANEOUS at 09:14

## 2022-05-03 RX ADMIN — METFORMIN HYDROCHLORIDE 500 MG: 500 TABLET ORAL at 18:15

## 2022-05-03 RX ADMIN — SERTRALINE HYDROCHLORIDE 50 MG: 50 TABLET ORAL at 09:13

## 2022-05-03 RX ADMIN — METFORMIN HYDROCHLORIDE 500 MG: 500 TABLET ORAL at 09:18

## 2022-05-03 RX ADMIN — LEVOTHYROXINE SODIUM 100 MCG: 0.1 TABLET ORAL at 05:54

## 2022-05-03 RX ADMIN — ATORVASTATIN CALCIUM 10 MG: 10 TABLET, FILM COATED ORAL at 09:13

## 2022-05-03 RX ADMIN — CLOPIDOGREL BISULFATE 75 MG: 75 TABLET ORAL at 09:13

## 2022-05-03 RX ADMIN — SODIUM CHLORIDE, PRESERVATIVE FREE 10 ML: 5 INJECTION INTRAVENOUS at 09:15

## 2022-05-03 RX ADMIN — GLIPIZIDE 5 MG: 5 TABLET ORAL at 18:15

## 2022-05-03 RX ADMIN — AMLODIPINE BESYLATE 5 MG: 5 TABLET ORAL at 09:13

## 2022-05-03 NOTE — PROGRESS NOTES
I spoke with Dr Socrates Hurd. Patient is on room air and would like to discharge tonight. Discharge med rec reviewed and sent on oral levaquin for 5 more days.  Electronically signed by Janet Garcia RN on 5/3/2022 at 4:41 PM

## 2022-05-03 NOTE — DISCHARGE SUMMARY
[place] [address] Internal Medicine    Discharge Summary     Patient ID: Farrukh Conrad  :  1945   MRN: [id number]     ACCOUNT:  [de-identified]   Patient's PCP: Dannielle Baxter MD  Admit Date: 2022   Discharge Date: 5/3/2022    Length of Stay: 2  Code Status:  Full Code  Admitting Physician: Marcelo Butt MD  Discharge Physician: Marcelo Butt MD     Active Discharge Diagnoses:     Primary Problem  Pneumonia of both lower lobes due to infectious organism      [place] Problems    Diagnosis Date Noted    Acute cystitis without hematuria [N30.00] 2022     Priority: Medium    Pneumonia of both lower lobes due to infectious organism [J18.9] 2022     Priority: Medium    Morbid obesity ([address] [E66.01] 2022     Priority: Medium    Diabetes ([address] [E11.9] 2015    HTN (hypertension) Alex Hoar 2015    Hyperlipidemia [E78.5] 2015    Hypothyroidism [E03.9] 2015       Admission Condition:  fair     Discharged Condition: fair    Hospital Stay:     Hospital Course:  Farrukh Conrad is a 78 y.o. female who was admitted for the management of Pneumonia of both lower lobes due to infectious organism , presented to ER with Fever     The patient is a 78 y.o.  /  female who presents   [facility] a 78 y. o. female with past medical history of diabetes, hyperlipidemia, hypertension, CAD status post 1 stenting who presents with family members for complaints of fever, myalgias, cough and shortness of breath.  Symptoms have been ongoing for the last 7 days however has been progressively worsening.  Patient has a cough productive of yellow sputum.  No known COVID-19 or other sick contacts at home.  Patient states she is visiting from [place] as she has family members in the area. Sanchez Duck chest pain, nausea, vomiting. Sachin Goodrich initial evaluation patient is hypoxic at 84% on room air, no prior history of requiring oxygen.   HAS PYURIA AND GRAM NEG RODS ON URINE CULTURE   DEBORAH BASILAR PNEUMONIA   Multifocal pneumonia bilateral lower lobes, more severe on the right.           Significant therapeutic interventions:     Significant Diagnostic Studies:   Labs / Micro:        ,     Radiology:    XR CHEST (2 VW)    Result Date: 5/3/2022  EXAMINATION: TWO XRAY VIEWS OF THE CHEST 5/3/2022 9:44 am COMPARISON: Chest x-ray dated 04/30/2022. CT chest dated 05/01/2022. HISTORY: ORDERING SYSTEM PROVIDED HISTORY: FOLLOW UP PNEUMONIA TECHNOLOGIST PROVIDED HISTORY: FOLLOW UP PNEUMONIA Reason for Exam: pneumonia FINDINGS: Heart size is stable. There are mild hazy opacities at the lung bases, increased from the previous chest radiograph. No evidence of pneumothorax or pleural effusion. No free air beneath the diaphragm. No acute osseous abnormality. Surgical changes in the shoulders. Mild bibasilar hazy opacities, possibly representing atelectasis or pneumonia. XR CHEST PORTABLE    Result Date: 4/30/2022  EXAMINATION: ONE XRAY VIEW OF THE CHEST 4/30/2022 10:24 pm COMPARISON: 02/06/2015 HISTORY: ORDERING SYSTEM PROVIDED HISTORY: hypoxia, fever, rhonchi b/l TECHNOLOGIST PROVIDED HISTORY: hypoxia, fever, rhonchi b/l Reason for Exam: hypoxia, fever, rhonchi b/l Additional signs and symptoms: cough FINDINGS: The patient is rotated to the right on the portable study. Heart size is within normal limits for the portable technique and the lungs are grossly clear. There is no pneumothorax or pleural fluid. Right shoulder reverse prosthesis. Prior left shoulder surgery. No acute bone finding. No acute cardiopulmonary disease. CT CHEST PULMONARY EMBOLISM W CONTRAST    Result Date: 5/1/2022  EXAMINATION: CTA OF THE CHEST 5/1/2022 12:09 am TECHNIQUE: CTA of the chest was performed after the administration of intravenous contrast.  Multiplanar reformatted images are provided for review. MIP images are provided for review.  Dose modulation, iterative reconstruction, and/or weight based adjustment of the mA/kV was utilized to reduce the radiation dose to as low as reasonably achievable. COMPARISON: None. HISTORY: ORDERING SYSTEM PROVIDED HISTORY: elevated d-dimer, hypoxic TECHNOLOGIST PROVIDED HISTORY: elevated d-dimer, hypoxic Decision Support Exception - unselect if not a suspected or confirmed emergency medical condition->Emergency Medical Condition (MA) Reason for Exam: cough with elevated D-dimer FINDINGS: Pulmonary Arteries: Pulmonary arteries are adequately opacified for evaluation. No evidence of intraluminal filling defect to suggest pulmonary embolism. Main pulmonary artery is normal in caliber. Mediastinum: Atherosclerosis of the thoracic aorta and coronary arteries noted. No pericardial effusion. Shotty prominent to mildly enlarged mediastinal and the right hilar lymph nodes, nonspecific and likely reactive. The representative lymph node in the azygoesophageal recess measures 2.7 x 1.5 cm on image number 103 axial series. Lungs/pleura: No pneumothorax. No pleural effusion. Subsegmental consolidation in the right lower lobe. Also, small ill-defined nodular densities in the bilateral lower lobes. These are suggestive of multifocal pneumonia. Upper Abdomen: A 2.2 cm hypodensity in the upper pole of the left kidney, probably a cyst.  Colonic diverticulosis noted. Status post cholecystectomy. Soft Tissues/Bones: Multilevel thoracic spondylosis. No evidence of pulmonary embolism. Multifocal pneumonia bilateral lower lobes, more severe on the right. Prominent to mildly enlarged mediastinal and right hilar lymph nodes, nonspecific and likely reactive.          Consultations:    Consults:     Final Specialist Recommendations/Findings:   IP CONSULT TO INTERNAL MEDICINE      The patient was seen and examined on day of discharge and this discharge summary is in conjunction with any daily progress note from day of discharge. Discharge plan:     Disposition: Home    Physician Follow Up:     No follow-up provider specified. Requiring Further Evaluation/Follow Up POST HOSPITALIZATION/Incidental Findings:    Diet: cardiac diet    Activity: As tolerated    Instructions to Patient:     Discharge Medications:      Medication List      ASK your doctor about these medications    * amLODIPine 5 MG tablet  Commonly known as: NORVASC     * amLODIPine 5 MG tablet  Commonly known as: NORVASC     atenolol 50 MG tablet  Commonly known as: TENORMIN     cephALEXin 500 MG capsule  Commonly known as: KEFLEX  Take 1 capsule by mouth 2 times daily. cloNIDine 0.1 MG tablet  Commonly known as: CATAPRES     clopidogrel 75 MG tablet  Commonly known as: PLAVIX     donepezil 10 MG tablet  Commonly known as: ARICEPT     famotidine 20 MG tablet  Commonly known as: PEPCID     gabapentin 800 MG tablet  Commonly known as: NEURONTIN     glimepiride 2 MG tablet  Commonly known as: AMARYL     * levothyroxine 100 MCG tablet  Commonly known as: SYNTHROID     * levothyroxine 100 MCG tablet  Commonly known as: SYNTHROID     memantine 5 MG tablet  Commonly known as: NAMENDA     * metFORMIN 500 MG tablet  Commonly known as: GLUCOPHAGE     * metFORMIN 500 MG tablet  Commonly known as: GLUCOPHAGE     ondansetron 4 MG tablet  Commonly known as: Zofran  Take 1 tablet by mouth every 8 hours as needed for Nausea. sertraline 50 MG tablet  Commonly known as: ZOLOFT     simvastatin 20 MG tablet  Commonly known as: ZOCOR     * valsartan 160 MG tablet  Commonly known as: DIOVAN     * valsartan 160 MG tablet  Commonly known as: DIOVAN     zolpidem 10 MG tablet  Commonly known as: AMBIEN         * This list has 8 medication(s) that are the same as other medications prescribed for you. Read the directions carefully, and ask your doctor or other care provider to review them with you.                 Time Spent on discharge is  35 mins in patient examination, evaluation, counseling as well as medication reconciliation, prescriptions for required medications, discharge plan and follow up. Electronically signed by   Aron Marie MD  5/3/2022  3:36 PM      Thank you Dr. James Carter MD for the opportunity to be involved in this patient's care.

## 2022-05-03 NOTE — PROGRESS NOTES
Patient education and discharge paperwork given to patient. All belongings sent with and questions answered.

## 2022-05-03 NOTE — CARE COORDINATION
ONGOING DISCHARGE PLAN:    Patient is alert and oriented x4. Spoke with patient regarding discharge plan and patient confirms that plan is still To DC back home to [place] w/Daughter on Wed. Denies VNS/Needs. Sating 94% on RA. Remains on IV Levaquin. CXR today. Anticipate DC today. Will continue to follow for additional discharge needs.     Electronically signed by Salome Reed RN on 5/3/2022 at 10:11 AM

## 2022-05-03 NOTE — PLAN OF CARE
Problem: Discharge Planning  Goal: Discharge to home or other facility with appropriate resources  Outcome: Completed  Note: Patient to discharge home.      Problem: Safety - Adult  Goal: Free from fall injury  Outcome: Completed  Goal: Free from fall injury  Description: INTERVENTIONS:  Outcome: Completed     Problem: Skin/Tissue Integrity  Goal: Skin integrity intact  Outcome: Completed     Problem: Chronic Conditions and Co-morbidities  Goal: Patient's chronic conditions and co-morbidity symptoms are monitored and maintained or improved  Outcome: Completed     Problem: Pain  Goal: Verbalizes/displays adequate comfort level or baseline comfort level  Outcome: Completed     Problem: ABCDS Injury Assessment  Goal: Absence of physical injury  Outcome: Completed

## 2022-05-03 NOTE — DISCHARGE INSTR - DIET

## 2022-05-04 NOTE — PROGRESS NOTES
CLINICAL PHARMACY NOTE: MEDS TO BEDS    Total # of Prescriptions Filled: 3   The following medications were delivered to the patient:  · Glipizide 5mg  · Levofloxacin 500mg  · Atorvastatin Calcium 10mg    Additional Documentation:  Delivered Medication to Patients Room

## 2022-05-05 ENCOUNTER — TELEPHONE (OUTPATIENT)
Dept: FAMILY MEDICINE CLINIC | Age: 77
End: 2022-05-05

## 2022-05-05 LAB
CULTURE: NORMAL
SPECIMEN DESCRIPTION: NORMAL

## 2022-05-05 NOTE — TELEPHONE ENCOUNTER
[facility] ([facility]) ED Follow up Call    Reason for ED visit:  [facility] appts/Provider:    No future appointments. VOICEMAIL DOCUMENTATION - ERASE IF NOT USED  Hi, this message is for  MYKEL  This is MAYA from [address] [place] [address] office. Just calling to see how you are doing after your recent visit to the Emergency Room. [address] [place] [address] wants to make sure you were able to fill any prescriptions and that you understand your discharge instructions. Please return our call if you need to make a follow up appointment with your provider or have any further needs. Our phone number is [phone]*. Have a great day.

## 2022-05-06 LAB
CULTURE: NORMAL
Lab: NORMAL
SPECIMEN DESCRIPTION: NORMAL